# Patient Record
Sex: FEMALE | URBAN - METROPOLITAN AREA
[De-identification: names, ages, dates, MRNs, and addresses within clinical notes are randomized per-mention and may not be internally consistent; named-entity substitution may affect disease eponyms.]

---

## 2017-03-13 ENCOUNTER — APPOINTMENT (RX ONLY)
Dept: URBAN - METROPOLITAN AREA CLINIC 131 | Facility: CLINIC | Age: 37
Setting detail: DERMATOLOGY
End: 2017-03-13

## 2017-03-13 DIAGNOSIS — L40.0 PSORIASIS VULGARIS: ICD-10-CM

## 2017-03-13 PROBLEM — F41.9 ANXIETY DISORDER, UNSPECIFIED: Status: ACTIVE | Noted: 2017-03-13

## 2017-03-13 PROCEDURE — 99213 OFFICE O/P EST LOW 20 MIN: CPT

## 2017-03-13 PROCEDURE — ? TREATMENT REGIMEN

## 2017-03-13 PROCEDURE — ? PRESCRIPTION

## 2017-03-13 PROCEDURE — ? COUNSELING

## 2017-03-13 RX ORDER — SULFACETAMIDE SODIUM 100 MG/ML
SHAMPOO TOPICAL
Qty: 1 | Refills: 2 | Status: ERX

## 2017-03-13 RX ORDER — CLOBETASOL PROPIONATE 0.5 MG/G
AEROSOL, FOAM TOPICAL
Qty: 1 | Refills: 0 | Status: ERX | COMMUNITY
Start: 2017-03-13

## 2017-03-13 RX ADMIN — CLOBETASOL PROPIONATE: 0.5 AEROSOL, FOAM TOPICAL at 13:20

## 2017-03-13 ASSESSMENT — LOCATION DETAILED DESCRIPTION DERM
LOCATION DETAILED: RIGHT OCCIPITAL SCALP
LOCATION DETAILED: MID-OCCIPITAL SCALP
LOCATION DETAILED: LEFT OCCIPITAL SCALP

## 2017-03-13 ASSESSMENT — LOCATION SIMPLE DESCRIPTION DERM: LOCATION SIMPLE: POSTERIOR SCALP

## 2017-03-13 ASSESSMENT — LOCATION ZONE DERM: LOCATION ZONE: SCALP

## 2017-03-13 NOTE — PROCEDURE: TREATMENT REGIMEN
Detail Level: Zone
Action 1: Continue
Otc Regimen: Wash scalp alternating T-Sal shampoo with prescription shampoo

## 2018-02-09 ENCOUNTER — RX ONLY (OUTPATIENT)
Age: 38
Setting detail: RX ONLY
End: 2018-02-09

## 2018-02-09 RX ORDER — CLOBETASOL PROPIONATE 0.5 MG/G
AEROSOL, FOAM TOPICAL
Qty: 1 | Refills: 0 | Status: ERX | COMMUNITY
Start: 2018-02-09

## 2018-02-09 RX ORDER — SULFACETAMIDE SODIUM 100 MG/ML
SHAMPOO TOPICAL
Qty: 1 | Refills: 0 | Status: ERX | COMMUNITY
Start: 2018-02-09

## 2018-02-27 ENCOUNTER — HISTORICAL (OUTPATIENT)
Dept: ADMINISTRATIVE | Facility: HOSPITAL | Age: 38
End: 2018-02-27

## 2018-03-13 ENCOUNTER — APPOINTMENT (RX ONLY)
Dept: URBAN - METROPOLITAN AREA CLINIC 87 | Facility: CLINIC | Age: 38
Setting detail: DERMATOLOGY
End: 2018-03-13

## 2018-03-13 ENCOUNTER — RX ONLY (OUTPATIENT)
Age: 38
Setting detail: RX ONLY
End: 2018-03-13

## 2018-03-13 DIAGNOSIS — L40.0 PSORIASIS VULGARIS: ICD-10-CM

## 2018-03-13 PROBLEM — F41.9 ANXIETY DISORDER, UNSPECIFIED: Status: ACTIVE | Noted: 2018-03-13

## 2018-03-13 PROCEDURE — ? PRESCRIPTION

## 2018-03-13 PROCEDURE — ? TREATMENT REGIMEN

## 2018-03-13 PROCEDURE — 99213 OFFICE O/P EST LOW 20 MIN: CPT

## 2018-03-13 PROCEDURE — ? COUNSELING

## 2018-03-13 RX ORDER — DICAPRYLYL CARBONATE/DIMETH
SPRAY, NON-AEROSOL (ML) TOPICAL
Qty: 1 | Refills: 3 | Status: ERX | COMMUNITY
Start: 2018-03-13

## 2018-03-13 RX ORDER — SULFACETAMIDE SODIUM 100 MG/ML
SHAMPOO TOPICAL
Qty: 1 | Refills: 5 | Status: ERX

## 2018-03-13 RX ORDER — CLOBETASOL PROPIONATE 0.5 MG/ML
SOLUTION TOPICAL
Qty: 1 | Refills: 3 | Status: ERX | COMMUNITY
Start: 2018-03-13

## 2018-03-13 RX ADMIN — Medication: at 19:21

## 2018-03-13 RX ADMIN — CLOBETASOL PROPIONATE: 0.5 SOLUTION TOPICAL at 19:18

## 2018-03-13 ASSESSMENT — LOCATION DETAILED DESCRIPTION DERM: LOCATION DETAILED: MID-OCCIPITAL SCALP

## 2018-03-13 ASSESSMENT — LOCATION SIMPLE DESCRIPTION DERM: LOCATION SIMPLE: POSTERIOR SCALP

## 2018-03-13 ASSESSMENT — LOCATION ZONE DERM: LOCATION ZONE: SCALP

## 2018-03-13 NOTE — PROCEDURE: TREATMENT REGIMEN
Action 4: Continue
Continue Regimen: Ovace Shampoo-wash scalp as directed alternating between T-Sal shampoo
Discontinue Regimen: Olux E Foam
Detail Level: Zone

## 2019-01-29 LAB
HIGH RISK HPV 16 (PRECISION): NEGATIVE
HIGH RISK HPV 18/45 (PRECISION): NEGATIVE
PAP RECOMMENDATION EXT: NORMAL
PAP SMEAR: NORMAL
POC BETA-HCG (QUAL): NEGATIVE

## 2019-02-05 ENCOUNTER — HISTORICAL (OUTPATIENT)
Dept: RADIOLOGY | Facility: HOSPITAL | Age: 39
End: 2019-02-05

## 2019-02-15 ENCOUNTER — HISTORICAL (OUTPATIENT)
Dept: RADIOLOGY | Facility: HOSPITAL | Age: 39
End: 2019-02-15

## 2019-02-15 LAB
ABS NEUT (OLG): 4.24 X10(3)/MCL (ref 2.1–9.2)
ALBUMIN SERPL-MCNC: 3.6 GM/DL (ref 3.4–5)
ALBUMIN/GLOB SERPL: 0.7 RATIO (ref 1.1–2)
ALP SERPL-CCNC: 109 UNIT/L (ref 45–117)
ALT SERPL-CCNC: 17 UNIT/L (ref 12–78)
APPEARANCE, UA: CLEAR
AST SERPL-CCNC: 14 UNIT/L (ref 15–37)
BACTERIA #/AREA URNS AUTO: ABNORMAL /[HPF]
BASOPHILS # BLD AUTO: 0.08 X10(3)/MCL
BASOPHILS NFR BLD AUTO: 1 %
BILIRUB SERPL-MCNC: 0.5 MG/DL (ref 0.2–1)
BILIRUB UR QL STRIP: NEGATIVE
BILIRUBIN DIRECT+TOT PNL SERPL-MCNC: 0.1 MG/DL
BILIRUBIN DIRECT+TOT PNL SERPL-MCNC: 0.4 MG/DL
BUN SERPL-MCNC: 11 MG/DL (ref 7–18)
CALCIUM SERPL-MCNC: 8.6 MG/DL (ref 8.5–10.1)
CHLORIDE SERPL-SCNC: 105 MMOL/L (ref 98–107)
CHOLEST SERPL-MCNC: 169 MG/DL
CHOLEST/HDLC SERPL: 3 {RATIO} (ref 0–4.4)
CO2 SERPL-SCNC: 25 MMOL/L (ref 21–32)
COLOR UR: NORMAL
CREAT SERPL-MCNC: 1 MG/DL (ref 0.6–1.3)
EOSINOPHIL # BLD AUTO: 0.36 10*3/UL
EOSINOPHIL NFR BLD AUTO: 5 %
ERYTHROCYTE [DISTWIDTH] IN BLOOD BY AUTOMATED COUNT: 17 % (ref 11.5–14.5)
EST. AVERAGE GLUCOSE BLD GHB EST-MCNC: 111 MG/DL
GLOBULIN SER-MCNC: 5 GM/ML (ref 2.3–3.5)
GLUCOSE (UA): NORMAL
GLUCOSE SERPL-MCNC: 92 MG/DL (ref 74–106)
HAV IGM SERPL QL IA: NONREACTIVE
HBA1C MFR BLD: 5.5 % (ref 4.2–6.3)
HBV CORE IGM SERPL QL IA: NONREACTIVE
HBV SURFACE AG SERPL QL IA: NEGATIVE
HCT VFR BLD AUTO: 32.9 % (ref 35–46)
HCV AB SERPL QL IA: NONREACTIVE
HDLC SERPL-MCNC: 56 MG/DL
HGB BLD-MCNC: 9.6 GM/DL (ref 12–16)
HGB UR QL STRIP: NEGATIVE
HIV 1+2 AB+HIV1 P24 AG SERPL QL IA: NONREACTIVE
HYALINE CASTS #/AREA URNS LPF: ABNORMAL /[LPF]
IMM GRANULOCYTES # BLD AUTO: 0.04 10*3/UL
IMM GRANULOCYTES NFR BLD AUTO: 1 %
KETONES UR QL STRIP: NEGATIVE
LDLC SERPL CALC-MCNC: 90 MG/DL (ref 0–130)
LEUKOCYTE ESTERASE UR QL STRIP: NEGATIVE
LYMPHOCYTES # BLD AUTO: 1.77 X10(3)/MCL
LYMPHOCYTES NFR BLD AUTO: 24 % (ref 13–40)
MCH RBC QN AUTO: 20.9 PG (ref 26–34)
MCHC RBC AUTO-ENTMCNC: 29.2 GM/DL (ref 31–37)
MCV RBC AUTO: 71.7 FL (ref 80–100)
MONOCYTES # BLD AUTO: 0.72 X10(3)/MCL
MONOCYTES NFR BLD AUTO: 10 % (ref 4–12)
NEUTROPHILS # BLD AUTO: 4.24 X10(3)/MCL
NEUTROPHILS NFR BLD AUTO: 59 X10(3)/MCL
NITRITE UR QL STRIP: NEGATIVE
PH UR STRIP: 5.5 [PH] (ref 4.5–8)
PLATELET # BLD AUTO: 433 X10(3)/MCL (ref 130–400)
PMV BLD AUTO: 9.6 FL (ref 7.4–10.4)
POTASSIUM SERPL-SCNC: 3.9 MMOL/L (ref 3.5–5.1)
PROT SERPL-MCNC: 8.6 GM/DL (ref 6.4–8.2)
PROT UR QL STRIP: NEGATIVE
RBC # BLD AUTO: 4.59 X10(6)/MCL (ref 4–5.2)
RBC #/AREA URNS AUTO: ABNORMAL /[HPF]
SODIUM SERPL-SCNC: 136 MMOL/L (ref 136–145)
SP GR UR STRIP: 1.02 (ref 1–1.03)
SQUAMOUS #/AREA URNS LPF: ABNORMAL /[LPF]
TRIGL SERPL-MCNC: 117 MG/DL
TSH SERPL-ACNC: 1.19 MIU/L (ref 0.36–3.74)
UROBILINOGEN UR STRIP-ACNC: NORMAL
VLDLC SERPL CALC-MCNC: 23 MG/DL
WBC # SPEC AUTO: 7.2 X10(3)/MCL (ref 4.5–11)
WBC #/AREA URNS AUTO: ABNORMAL /HPF

## 2019-09-27 ENCOUNTER — RX ONLY (OUTPATIENT)
Age: 39
Setting detail: RX ONLY
End: 2019-09-27

## 2019-09-27 ENCOUNTER — APPOINTMENT (RX ONLY)
Dept: URBAN - METROPOLITAN AREA CLINIC 87 | Facility: CLINIC | Age: 39
Setting detail: DERMATOLOGY
End: 2019-09-27

## 2019-09-27 DIAGNOSIS — L81.1 CHLOASMA: ICD-10-CM

## 2019-09-27 DIAGNOSIS — L40.0 PSORIASIS VULGARIS: ICD-10-CM | Status: WELL CONTROLLED

## 2019-09-27 PROCEDURE — 99213 OFFICE O/P EST LOW 20 MIN: CPT

## 2019-09-27 PROCEDURE — ? COUNSELING

## 2019-09-27 PROCEDURE — ? TREATMENT REGIMEN

## 2019-09-27 RX ORDER — CLOBETASOL PROPIONATE 0.46 MG/ML
SOLUTION TOPICAL
Qty: 1 | Refills: 3 | Status: ERX

## 2019-09-27 RX ORDER — SULFACETAMIDE SODIUM 100 MG/ML
SHAMPOO TOPICAL
Qty: 1 | Refills: 5 | Status: ERX

## 2019-09-27 RX ORDER — DICAPRYLYL CARBONATE/DIMETH
SPRAY, NON-AEROSOL (ML) TOPICAL
Qty: 1 | Refills: 3 | Status: ERX

## 2019-09-27 ASSESSMENT — LOCATION SIMPLE DESCRIPTION DERM
LOCATION SIMPLE: RIGHT CHEEK
LOCATION SIMPLE: LEFT CHEEK
LOCATION SIMPLE: POSTERIOR SCALP

## 2019-09-27 ASSESSMENT — LOCATION ZONE DERM
LOCATION ZONE: SCALP
LOCATION ZONE: FACE

## 2019-09-27 ASSESSMENT — LOCATION DETAILED DESCRIPTION DERM
LOCATION DETAILED: RIGHT CENTRAL MALAR CHEEK
LOCATION DETAILED: MID-OCCIPITAL SCALP
LOCATION DETAILED: LEFT CENTRAL MALAR CHEEK

## 2019-09-27 NOTE — PROCEDURE: TREATMENT REGIMEN
Action 2: Continue
Detail Level: Zone
Other Instructions: Will send refills since pt is happy with the results
Plan: Suggested hydroquinone 10% and kojic acid 6% compounding cream 2x daily for 8 weeks [facility] and wear sunscreen daily with significant sun protection. Rtc in 8 weeks

## 2021-11-10 ENCOUNTER — HISTORICAL (OUTPATIENT)
Dept: ADMINISTRATIVE | Facility: HOSPITAL | Age: 41
End: 2021-11-10

## 2021-11-10 LAB
ABS NEUT (OLG): 4.37 X10(3)/MCL (ref 2.1–9.2)
ALBUMIN SERPL-MCNC: 3.4 GM/DL (ref 3.5–5)
ALBUMIN/GLOB SERPL: 0.8 RATIO (ref 1.1–2)
ALP SERPL-CCNC: 102 UNIT/L (ref 40–150)
ALT SERPL-CCNC: 13 UNIT/L (ref 0–55)
APPEARANCE, UA: CLEAR
AST SERPL-CCNC: 17 UNIT/L (ref 5–34)
BACTERIA #/AREA URNS AUTO: ABNORMAL /HPF
BASOPHILS # BLD AUTO: 0.1 X10(3)/MCL (ref 0–0.2)
BASOPHILS NFR BLD AUTO: 1 %
BILIRUB SERPL-MCNC: 0.4 MG/DL
BILIRUB UR QL STRIP: NEGATIVE
BILIRUBIN DIRECT+TOT PNL SERPL-MCNC: 0.2 MG/DL (ref 0–0.5)
BILIRUBIN DIRECT+TOT PNL SERPL-MCNC: 0.2 MG/DL (ref 0–0.8)
BUN SERPL-MCNC: 12 MG/DL (ref 7–18.7)
CALCIUM SERPL-MCNC: 9.3 MG/DL (ref 8.7–10.5)
CHLORIDE SERPL-SCNC: 107 MMOL/L (ref 98–107)
CHOLEST SERPL-MCNC: 156 MG/DL
CHOLEST/HDLC SERPL: 3 {RATIO} (ref 0–5)
CO2 SERPL-SCNC: 24 MMOL/L (ref 22–29)
COLOR UR: ABNORMAL
CREAT SERPL-MCNC: 0.79 MG/DL (ref 0.55–1.02)
DEPRECATED CALCIDIOL+CALCIFEROL SERPL-MC: 9.3 NG/ML (ref 30–80)
EOSINOPHIL # BLD AUTO: 0.4 X10(3)/MCL (ref 0–0.9)
EOSINOPHIL NFR BLD AUTO: 5 %
ERYTHROCYTE [DISTWIDTH] IN BLOOD BY AUTOMATED COUNT: 17.8 % (ref 11.5–14.5)
EST CREAT CLEARANCE SER (OHS): 58.87 ML/MIN
EST. AVERAGE GLUCOSE BLD GHB EST-MCNC: 99.7 MG/DL
FERRITIN SERPL-MCNC: 5.47 NG/ML (ref 4.63–204)
GLOBULIN SER-MCNC: 4.3 GM/DL (ref 2.4–3.5)
GLUCOSE (UA): NEGATIVE
GLUCOSE SERPL-MCNC: 105 MG/DL (ref 74–100)
HBA1C MFR BLD: 5.1 %
HCT VFR BLD AUTO: 29.2 % (ref 35–46)
HDLC SERPL-MCNC: 50 MG/DL (ref 35–60)
HGB BLD-MCNC: 8.7 GM/DL (ref 12–16)
HGB UR QL STRIP: NEGATIVE
HIV 1+2 AB+HIV1 P24 AG SERPL QL IA: NONREACTIVE
HYALINE CASTS #/AREA URNS LPF: ABNORMAL /LPF
HYPOCHROMIA BLD QL SMEAR: NORMAL
IMM GRANULOCYTES # BLD AUTO: 0.02 10*3/UL
IMM GRANULOCYTES NFR BLD AUTO: 0 %
IRON SATN MFR SERPL: 5 % (ref 20–50)
IRON SERPL-MCNC: 19 UG/DL (ref 50–170)
KETONES UR QL STRIP: NEGATIVE
LDLC SERPL CALC-MCNC: 90 MG/DL (ref 50–140)
LEUKOCYTE ESTERASE UR QL STRIP: 25 LEU/UL
LYMPHOCYTES # BLD AUTO: 1.8 X10(3)/MCL (ref 0.6–4.6)
LYMPHOCYTES NFR BLD AUTO: 25 %
MCH RBC QN AUTO: 19.9 PG (ref 26–34)
MCHC RBC AUTO-ENTMCNC: 29.8 GM/DL (ref 31–37)
MCV RBC AUTO: 66.7 FL (ref 80–100)
MONOCYTES # BLD AUTO: 0.7 X10(3)/MCL (ref 0.1–1.3)
MONOCYTES NFR BLD AUTO: 9 %
NEUTROPHILS # BLD AUTO: 4.37 X10(3)/MCL (ref 2.1–9.2)
NEUTROPHILS NFR BLD AUTO: 59 %
NITRITE UR QL STRIP: NEGATIVE
NRBC BLD AUTO-RTO: 0 % (ref 0–0.2)
PH UR STRIP: 5.5 [PH] (ref 4.5–8)
PLATELET # BLD AUTO: 425 X10(3)/MCL (ref 130–400)
PLATELET # BLD EST: NORMAL 10*3/UL
PMV BLD AUTO: 9.6 FL (ref 7.4–10.4)
POTASSIUM SERPL-SCNC: 3.6 MMOL/L (ref 3.5–5.1)
PROT SERPL-MCNC: 7.7 GM/DL (ref 6.4–8.3)
PROT UR QL STRIP: NEGATIVE
RBC # BLD AUTO: 4.38 X10(6)/MCL (ref 4–5.2)
RBC #/AREA URNS AUTO: ABNORMAL /HPF
RBC MORPH BLD: NORMAL
SODIUM SERPL-SCNC: 137 MMOL/L (ref 136–145)
SP GR UR STRIP: 1.02 (ref 1–1.03)
SQUAMOUS #/AREA URNS LPF: ABNORMAL /LPF
T PALLIDUM AB SER QL: NONREACTIVE
T4 FREE SERPL-MCNC: 0.83 NG/DL (ref 0.7–1.48)
TARGETS BLD QL SMEAR: NORMAL
TIBC SERPL-MCNC: 384 UG/DL (ref 70–310)
TIBC SERPL-MCNC: 403 UG/DL (ref 250–450)
TRANSFERRIN SERPL-MCNC: 375 MG/DL (ref 180–382)
TRIGL SERPL-MCNC: 80 MG/DL (ref 37–140)
TSH SERPL-ACNC: 1.58 UIU/ML (ref 0.35–4.94)
UROBILINOGEN UR STRIP-ACNC: 2 MG/DL
VLDLC SERPL CALC-MCNC: 16 MG/DL
WBC # SPEC AUTO: 7.4 X10(3)/MCL (ref 4.5–11)
WBC #/AREA URNS AUTO: ABNORMAL /HPF

## 2021-11-12 LAB — FINAL CULTURE: NORMAL

## 2022-04-10 ENCOUNTER — HISTORICAL (OUTPATIENT)
Dept: ADMINISTRATIVE | Facility: HOSPITAL | Age: 42
End: 2022-04-10
Payer: MEDICAID

## 2022-04-29 VITALS
SYSTOLIC BLOOD PRESSURE: 130 MMHG | WEIGHT: 216.06 LBS | WEIGHT: 218.5 LBS | DIASTOLIC BLOOD PRESSURE: 87 MMHG | SYSTOLIC BLOOD PRESSURE: 137 MMHG | BODY MASS INDEX: 45.35 KG/M2 | DIASTOLIC BLOOD PRESSURE: 86 MMHG | HEIGHT: 58 IN | BODY MASS INDEX: 45.87 KG/M2 | HEIGHT: 58 IN

## 2022-05-02 NOTE — HISTORICAL [facility] CERNER
This is a historical note converted from Darlene. Formatting and pictures may have been removed.  Please reference Darlene for original formatting and attached multimedia. Chief Complaint  establish care  History of Present Illness  41?yo?female here today?to establish care. PMH HTN, anemia,?& migraines.? Negative FH Cancers.?Nonsmoker. ?  ?   Cervical Cancer Screening?-?[facility] GYN Referral  Breast Cancer?Screening -?11/10/21 Ordered  Osteoporosis Screening?-? 11/10/21 Vitamin D Level  STI Screening -?11/10/21 Ordered  ?   Todays Visit:  Pt reports a hx of HTN - not currently taking any medications, BP controlled in clinic today; hx of anemia, will order labs today for eval. Denies any acute issues today. Agreeable to labs, MMG, GYN, & Flu shot.  Denies chest pain, shortness of breath,?cough, fever, headache,?dizziness, weakness, abdominal pain, nausea,?vomiting, diarrhea, constipation, black/bloody stools, unplanned weight loss, night sweats, changes in urinary patterns, burning/odor with urination, depression, anxiety, and SI/HI.??  Review of Systems  Constitutional:?no fever, fatigue, weakness  Eye:?no vision loss, eye redness, drainage, or pain  ENMT:?no sore throat, ear pain, sinus pain/congestion, nasal congestion/drainage  Respiratory:?no cough, no wheezing, no shortness of breath  Cardiovascular:?no chest pain, no palpitations, no edema  Gastrointestinal:?no nausea, vomiting, or diarrhea. No abdominal pain  Genitourinary:?no dysuria, no urinary frequency or urgency, no hematuria  Hema/Lymph:?no abnormal bruising or bleeding  Endocrine:?no heat or cold intolerance, no excessive thirst or excessive urination  Musculoskeletal:?no muscle or joint pain, no joint swelling  Integumentary:?no skin rash or abnormal lesion  Neurologic: no headache, no dizziness, no weakness or numbness  Physical Exam  Vitals & Measurements  T:?36.8? ?C (Oral)? RR:?16? BP:?130/86?  HT:?147.00?cm? WT:?98.000?kg? BMI:?45.35? LMP:?11/01/2021  00:00 CDT?  General:?well-developed well-nourished in no acute distress  Eye: PERRLA, EOMI, clear conjunctiva, eyelids normal  HENT:?TMs/ear canals clear, oropharynx without erythema/exudate, oropharynx and nasal mucosal surfaces moist, no maxillary/frontal sinus tenderness to palpation  Neck: full range of motion, no thyromegaly or lymphadenopathy  Respiratory:?clear to auscultation bilaterally  Cardiovascular:?regular rate and rhythm without murmurs, gallops or rubs  Gastrointestinal:?soft, non-tender, non-distended with normal bowel sounds, without masses to palpation  Genitourinary: no CVA tenderness to palpation  Musculoskeletal:?full range of motion of all extremities/spine without limitation or discomfort  Integumentary: no rashes or skin lesions present  Neurologic: cranial nerves intact, no signs of peripheral neurological deficit, motor/sensory function intact  Assessment/Plan  1.?Wellness examination?Z00.00  Wellness Exam + Labs  Ordered:  1160F- Medication reconciliation completed during visit, Wellness examination  Anemia, unspecified  BMI 45.0-49.9, adult, [facility] Med Service, 11/10/21 10:10:00 CST  CBC w/ Auto Diff, Routine collect, *Est. 11/10/22 3:00:00 CST, Blood, Order for future visit, *Est. Stop date 11/10/22 3:00:00 CST, Lab Collect, Wellness examination  Anemia, unspecified  BMI 45.0-49.9, adult, 11/10/21 10:09:00 CST  CBC w/ Auto Diff, Routine collect, *Est. 11/10/21 3:00:00 CST, Blood, Order for future visit, *Est. Stop date 11/10/21 3:00:00 CST, Lab Collect, Wellness examination, 11/10/21 10:03:00 CST  Chlam trachom & N gonorrhoeae by MAY-[facility] [id number], Routine collect, Urine, Order for future visit, *Est. 11/10/21 3:00:00 CST, *Est. Stop date 11/10/21 3:00:00 CST, Nurse collect, Wellness examination, 11/10/21 10:03:00 CST  Clinic Follow up, *Est. 11/10/22 3:00:00 CST, Order for future visit, Wellness examination  Anemia, unspecified  BMI 45.0-49.9, adult, [facility] Med  [facility]  Comprehensive Metabolic Panel, Routine collect, *Est. 11/10/22 3:00:00 CST, Blood, Order for future visit, *Est. Stop date 11/10/22 3:00:00 CST, Lab Collect, Wellness examination  Anemia, unspecified  BMI 45.0-49.9, adult, 11/10/21 10:09:00 CST  Comprehensive Metabolic Panel, Routine collect, *Est. 11/10/21 3:00:00 CST, Blood, Order for future visit, *Est. Stop date 11/10/21 3:00:00 CST, Lab Collect, Wellness examination, 11/10/21 10:03:00 CST  Free T4, Routine collect, *Est. 11/10/21 3:00:00 CST, Blood, Order for future visit, *Est. Stop date 11/10/21 3:00:00 CST, Lab Collect, Wellness examination, 11/10/21 10:03:00 CST  Free T4, Routine collect, *Est. 11/10/22 3:00:00 CST, Blood, Order for future visit, *Est. Stop date 11/10/22 3:00:00 CST, Lab Collect, Wellness examination  Anemia, unspecified  BMI 45.0-49.9, adult, 11/10/21 10:09:00 CST  Hemoglobin A1C UHC, Routine collect, *Est. 11/10/22 3:00:00 CST, Blood, Order for future visit, *Est. Stop date 11/10/22 3:00:00 CST, Lab Collect, Wellness examination  Anemia, unspecified  BMI 45.0-49.9, adult, 11/10/21 10:09:00 CST  Hemoglobin A1C UHC, Routine collect, *Est. 11/10/21 3:00:00 CST, Blood, Order for future visit, *Est. Stop date 11/10/21 3:00:00 CST, Lab Collect, BMI 45.0-49.9, adult, 11/10/21 10:02:00 CST  HIV 1 and 2, Routine collect, *Est. 11/10/21 3:00:00 CST, Blood, Order for future visit, *Est. Stop date 11/10/21 3:00:00 CST, Lab Collect, Wellness examination, 11/10/21 10:03:00 CST  Lipid Panel, Routine collect, *Est. 11/10/21 3:00:00 CST, Blood, Order for future visit, *Est. Stop date 11/10/21 3:00:00 CST, Lab Collect, Wellness examination, 11/10/21 10:03:00 CST  Lipid Panel, Routine collect, *Est. 11/10/22 3:00:00 CST, Blood, Order for future visit, *Est. Stop date 11/10/22 3:00:00 CST, Lab Collect, Wellness examination  Anemia, unspecified  BMI 45.0-49.9, adult, 11/10/21 10:09:00 CST  Office/Outpatient Visit Level 4 Established [id number] PC,  Wellness examination  Anemia, unspecified  BMI 45.0-49.9, adult, [facility] Internal Med Service, 11/10/21 10:09:00 CST  Syphilis Antibody (with Reflex RPR), Routine collect, *Est. 11/10/21 3:00:00 CST, Blood, Order for future visit, *Est. Stop date 11/10/21 3:00:00 CST, Lab Collect, Wellness examination, 11/10/21 10:03:00 CST  Thyroid Stimulating Hormone, Routine collect, *Est. 11/10/21 3:00:00 CST, Blood, Order for future visit, *Est. Stop date 11/10/21 3:00:00 CST, Lab Collect, Wellness examination, 11/10/21 10:03:00 CST  Thyroid Stimulating Hormone, Routine collect, *Est. 11/10/22 3:00:00 CST, Blood, Order for future visit, *Est. Stop date 11/10/22 3:00:00 CST, Lab Collect, Wellness examination  Anemia, unspecified  BMI 45.0-49.9, adult, 11/10/21 10:09:00 CST  Urinalysis with Microscopic if Indicated, Routine collect, Urine, Order for future visit, *Est. 11/10/22 3:00:00 CST, *Est. Stop date 11/10/22 3:00:00 CST, Nurse collect, Wellness examination  Anemia, unspecified  BMI 45.0-49.9, adult  Urinalysis with Microscopic if Indicated, Routine collect, Urine, Order for future visit, *Est. 11/10/21 3:00:00 CST, *Est. Stop date 11/10/21 3:00:00 CST, Nurse collect, Wellness examination  Vitamin D, 25-Hydroxy Level, Routine collect, *Est. 11/10/21 3:00:00 CST, Blood, Order for future visit, *Est. Stop date 11/10/21 3:00:00 CST, Lab Collect, Wellness examination, 11/10/21 10:03:00 CST  Vitamin D, 25-Hydroxy Level, Routine collect, *Est. 11/10/22 3:00:00 CST, Blood, Order for future visit, *Est. Stop date 11/10/22 3:00:00 CST, Lab Collect, Wellness examination  Anemia, unspecified  BMI 45.0-49.9, adult, 11/10/21 10:09:00 CST  ?  2.?Anemia, unspecified?D64.9  Labs ordered  Ordered:  1160F- Medication reconciliation completed during visit, Wellness examination  Anemia, unspecified  BMI 45.0-49.9, adult, [facility] Med Service, 11/10/21 10:10:00 CST  CBC w/ Auto Diff, Routine collect, *Est. 11/10/22 3:00:00 CST,  Blood, Order for future visit, *Est. Stop date 11/10/22 3:00:00 CST, Lab Collect, Wellness examination  Anemia, unspecified  BMI 45.0-49.9, adult, 11/10/21 10:09:00 CST  Clinic Follow up, *Est. 11/10/22 3:00:00 CST, Order for future visit, Wellness examination  Anemia, unspecified  BMI 45.0-49.9, adult, [facility] Internal Med Service  Comprehensive Metabolic Panel, Routine collect, *Est. 11/10/22 3:00:00 CST, Blood, Order for future visit, *Est. Stop date 11/10/22 3:00:00 CST, Lab Collect, Wellness examination  Anemia, unspecified  BMI 45.0-49.9, adult, 11/10/21 10:09:00 CST  Ferritin, Routine collect, *Est. 11/10/21 3:00:00 CST, Blood, Order for future visit, *Est. Stop date 11/10/21 3:00:00 CST, Lab Collect, Anemia, unspecified, 11/10/21 10:04:00 CST  Free T4, Routine collect, *Est. 11/10/22 3:00:00 CST, Blood, Order for future visit, *Est. Stop date 11/10/22 3:00:00 CST, Lab Collect, Wellness examination  Anemia, unspecified  BMI 45.0-49.9, adult, 11/10/21 10:09:00 CST  Hemoglobin A1C UHC, Routine collect, *Est. 11/10/22 3:00:00 CST, Blood, Order for future visit, *Est. Stop date 11/10/22 3:00:00 CST, Lab Collect, Wellness examination  Anemia, unspecified  BMI 45.0-49.9, adult, 11/10/21 10:09:00 CST  Iron Binding Capacity Total - Iron Profile, Routine collect, *Est. 11/10/21 3:00:00 CST, Blood, Order for future visit, *Est. Stop date 11/10/21 3:00:00 CST, Lab Collect, Anemia, unspecified, 11/10/21 10:04:00 CST  Iron Level, Routine collect, *Est. 11/10/21 3:00:00 CST, Blood, Order for future visit, *Est. Stop date 11/10/21 3:00:00 CST, Lab Collect, Anemia, unspecified, 11/11/21 5:00:00 CST  Lipid Panel, Routine collect, *Est. 11/10/22 3:00:00 CST, Blood, Order for future visit, *Est. Stop date 11/10/22 3:00:00 CST, Lab Collect, Wellness examination  Anemia, unspecified  BMI 45.0-49.9, adult, 11/10/21 10:09:00 CST  Office/Outpatient Visit Level 4 Established [id number] PC, Wellness examination  Anemia,  unspecified  BMI 45.0-49.9, adult, [facility] Internal Med Service, 11/10/21 10:09:00 CST  Thyroid Stimulating Hormone, Routine collect, *Est. 11/10/22 3:00:00 CST, Blood, Order for future visit, *Est. Stop date 11/10/22 3:00:00 CST, Lab Collect, Wellness examination  Anemia, unspecified  BMI 45.0-49.9, adult, 11/10/21 10:09:00 CST  Urinalysis with Microscopic if Indicated, Routine collect, Urine, Order for future visit, *Est. 11/10/22 3:00:00 CST, *Est. Stop date 11/10/22 3:00:00 CST, Nurse collect, Wellness examination  Anemia, unspecified  BMI 45.0-49.9, adult  Vitamin D, 25-Hydroxy Level, Routine collect, *Est. 11/10/22 3:00:00 CST, Blood, Order for future visit, *Est. Stop date 11/10/22 3:00:00 CST, Lab Collect, Wellness examination  Anemia, unspecified  BMI 45.0-49.9, adult, 11/10/21 10:09:00 CST  ?  3.?BMI 45.0-49.9, adult?Z68.42  goal BMI <30.  Exercise 5 times a week for 30 minutes per day.  Avoid soda, simple sugars, excessive rice, potatoes or bread. Limit fast foods and fried foods.  Choose complex carbs in moderation (example: green vegetables, beans, oatmeal). Eat plenty of fresh fruits and vegetables with lean meats daily.  Do not skip meals. Eat a balanced portion size.  Avoid fad diets. Consider permanent healthy life style changes.  Ordered:  1160F- Medication reconciliation completed during visit, Wellness examination  Anemia, unspecified  BMI 45.0-49.9, adult, [facility] Internal Med Service, 11/10/21 10:10:00 CST  CBC w/ Auto Diff, Routine collect, *Est. 11/10/22 3:00:00 CST, Blood, Order for future visit, *Est. Stop date 11/10/22 3:00:00 CST, Lab Collect, Wellness examination  Anemia, unspecified  BMI 45.0-49.9, adult, 11/10/21 10:09:00 CST  Clinic Follow up, *Est. 11/10/22 3:00:00 CST, Order for future visit, Wellness examination  Anemia, unspecified  BMI 45.0-49.9, adult, [facility] Med Service  Comprehensive Metabolic Panel, Routine collect, *Est. 11/10/22 3:00:00 CST, Blood, Order for  future visit, *Est. Stop date 11/10/22 3:00:00 CST, Lab Collect, Wellness examination  Anemia, unspecified  BMI 45.0-49.9, adult, 11/10/21 10:09:00 CST  Free T4, Routine collect, *Est. 11/10/22 3:00:00 CST, Blood, Order for future visit, *Est. Stop date 11/10/22 3:00:00 CST, Lab Collect, Wellness examination  Anemia, unspecified  BMI 45.0-49.9, adult, 11/10/21 10:09:00 CST  Hemoglobin A1C UHC, Routine collect, *Est. 11/10/22 3:00:00 CST, Blood, Order for future visit, *Est. Stop date 11/10/22 3:00:00 CST, Lab Collect, Wellness examination  Anemia, unspecified  BMI 45.0-49.9, adult, 11/10/21 10:09:00 CST  Hemoglobin A1C UHC, Routine collect, *Est. 11/10/21 3:00:00 CST, Blood, Order for future visit, *Est. Stop date 11/10/21 3:00:00 CST, Lab Collect, BMI 45.0-49.9, adult, 11/10/21 10:02:00 CST  Lipid Panel, Routine collect, *Est. 11/10/22 3:00:00 CST, Blood, Order for future visit, *Est. Stop date 11/10/22 3:00:00 CST, Lab Collect, Wellness examination  Anemia, unspecified  BMI 45.0-49.9, adult, 11/10/21 10:09:00 CST  Office/Outpatient Visit Level 4 Established [id number] PC, Wellness examination  Anemia, unspecified  BMI 45.0-49.9, adult, [facility] Internal Med Service, 11/10/21 10:09:00 CST  Thyroid Stimulating Hormone, Routine collect, *Est. 11/10/22 3:00:00 CST, Blood, Order for future visit, *Est. Stop date 11/10/22 3:00:00 CST, Lab Collect, Wellness examination  Anemia, unspecified  BMI 45.0-49.9, adult, 11/10/21 10:09:00 CST  Urinalysis with Microscopic if Indicated, Routine collect, Urine, Order for future visit, *Est. 11/10/22 3:00:00 CST, *Est. Stop date 11/10/22 3:00:00 CST, Nurse collect, Wellness examination  Anemia, unspecified  BMI 45.0-49.9, adult  Vitamin D, 25-Hydroxy Level, Routine collect, *Est. 11/10/22 3:00:00 CST, Blood, Order for future visit, *Est. Stop date 11/10/22 3:00:00 CST, Lab Collect, Wellness examination  Anemia, unspecified  BMI 45.0-49.9, adult, 11/10/21 10:09:00  CST  ?  4.?Immunization due?Z23,?Immunization due?Z23  Flu shot today  Ordered:  Influenza Virus Vaccine, Inactivated, 0.5 mL, form: Susp, IM, Once-Unscheduled, first dose 11/10/21 10:09:00 CST  ?  5.?Breast cancer screening?Z12.39  MMG Ordered  Ordered:  MG Screening Bilateral, Routine, *Est. 21 3:00:00 CST, Screening, None, Ambulatory, Rad Type, Order for future visit, Breast cancer screening, Schedule this test, [facility], Follow Breast Imaging Order Set Protocol, *Est. 21 3:00:00 CST  ?  6.?Cervical cancer screening?Z12.4,?Cervical cancer screening?Z12.4  GYN Referral  ?  Referrals  [facility] Internal Referral to Gynecology Clinic, Specialty: Gynecology, Reason: Wellness / pap, Start: 11/10/21 10:07:00 CST  Clinic Follow up, *Est. 11/10/22 3:00:00 CST, Order for future visit, Wellness examination  Anemia, unspecified  BMI 45.0-49.9, adult, [facility] Internal Med Service  RTC?prn and 12 months  Medications reviewed & reconciled during visit  Labs reviewed, verbalized understanding  Labs 1 week prior to next appointment  COVID 19 Precautions discussed  ?   Discussed with patient health goals related to [facility]?- Restore, Maintain,?&?Improve Health  Patient Goal this visit:?Maintain   Problem List/Past Medical History  Ongoing  Dysmenorrhea(  Confirmed  )  Edema(  Confirmed  )  Migraines(  Confirmed  )  Obesity(  Confirmed  )  Sacroiliitis(  Confirmed  )  Historical  HTN (hypertension)(  Confirmed  )  Procedure/Surgical History  Bilateral tubal ligation   section x2  Cholecystectomy   Medications  influenza virus vaccine, inactivated quadrivalent intramuscular suspension, 0.5 mL, IM, Once-Unscheduled  Allergies  No Known Allergies  Social History  Abuse/Neglect  No, 2020  Alcohol  Never, 2018  Employment/School  Unemployed, 2019  Exercise  Exercise frequency: 1-2 times/week. Exercise type: Walking., 2018  Home/Environment  Lives with  Children, Spouse. Alcohol abuse in household: No. Substance abuse in household: No. Smoker in household: No. Injuries/Abuse/Neglect in household: No. Feels unsafe at home: No. Safe place to go: Yes. Family/Friends available for support: Yes., 02/27/2018  Nutrition/Health  Regular, 02/27/2018  Sexual  Sexually active: Yes. Number of current partners 1. Gender Identity Identifies as female., 01/29/2019  Substance Use  Never, 02/27/2018  Tobacco  Never (less than 100 in lifetime), N/A, 01/21/2020  Never (less than 100 in lifetime), N/A, 02/18/2019  Family History  Family history is negative  Immunizations  Vaccine Date Status   COVID-19 mRNA, LNP-S, PF - Moderna 04/17/2021 Recorded   COVID-19 mRNA, LNP-S, PF - Moderna 03/17/2021 Recorded   influenza virus vaccine, inactivated 02/27/2018 Given   tetanus/diphtheria/pertussis, acel(Tdap) 02/27/2018 Given   influenza virus vaccine, inactivated 11/01/2012 Recorded   Health Maintenance  Health Maintenance  ???Pending?(in the next year)  ??? ??OverDue  ??? ? ? ?Alcohol Misuse Screening due??01/02/21??and every 1??year(s)  ??? ? ? ?Hypertension Management-BMP due??01/20/21??and every 1??year(s)  ??? ??Due In Future?  ??? ? ? ?Obesity Screening not due until??01/01/22??and every 1??year(s)  ??? ? ? ?Cervical Cancer Screening not due until??01/28/22??and every 3??year(s)  ???Satisfied?(in the past 1 year)  ??? ??Satisfied?  ??? ? ? ?ADL Screening on??11/10/21.??Satisfied by Laisha Bhat LPN  ??? ? ? ?Blood Pressure Screening on??11/10/21.??Satisfied by Laisha Bhat LPN  ??? ? ? ?Body Mass Index Check on??11/10/21.??Satisfied by Laisha Bhat LPN  ??? ? ? ?Depression Screening on??11/10/21.??Satisfied by Laisha Bhat LPN  ??? ? ? ?Hypertension Management-Blood Pressure on??11/10/21.??Satisfied by Laisha Bhat LPN  ??? ? ? ?Influenza Vaccine on??11/10/21.??Satisfied by Kassy Silveira  ??? ? ? ?Obesity Screening on??11/10/21.??Satisfied by Laisha Bhat LPN  ?       1304 Called and discussed lab results with patient; verbalized understanding of RX Vitamin D and starting OTC Vitamin D + Calcium once completed, also understands daily iron supplement with Vitamin C tab or OJ.

## 2022-09-15 ENCOUNTER — HISTORICAL (OUTPATIENT)
Dept: ADMINISTRATIVE | Facility: HOSPITAL | Age: 42
End: 2022-09-15
Payer: MEDICAID

## 2023-05-17 ENCOUNTER — HOSPITAL ENCOUNTER (EMERGENCY)
Facility: HOSPITAL | Age: 43
Discharge: HOME | End: 2023-05-17
Attending: EMERGENCY MEDICINE
Payer: MEDICAID

## 2023-05-17 VITALS
DIASTOLIC BLOOD PRESSURE: 91 MMHG | TEMPERATURE: 98 F | HEART RATE: 72 BPM | HEIGHT: 57 IN | WEIGHT: 199.38 LBS | OXYGEN SATURATION: 100 % | RESPIRATION RATE: 12 BRPM | BODY MASS INDEX: 43.02 KG/M2 | SYSTOLIC BLOOD PRESSURE: 114 MMHG

## 2023-05-17 DIAGNOSIS — R11.2 NAUSEA VOMITING AND DIARRHEA: ICD-10-CM

## 2023-05-17 DIAGNOSIS — I10 HYPERTENSION, UNSPECIFIED TYPE: Primary | ICD-10-CM

## 2023-05-17 DIAGNOSIS — R19.7 NAUSEA VOMITING AND DIARRHEA: ICD-10-CM

## 2023-05-17 DIAGNOSIS — I10 HYPERTENSION: ICD-10-CM

## 2023-05-17 LAB
ALBUMIN SERPL-MCNC: 3.1 G/DL (ref 3.5–5)
ALBUMIN/GLOB SERPL: 0.7 RATIO (ref 1.1–2)
ALP SERPL-CCNC: 106 UNIT/L (ref 40–150)
ALT SERPL-CCNC: 11 UNIT/L (ref 0–55)
ANISOCYTOSIS BLD QL SMEAR: SLIGHT
APPEARANCE UR: CLEAR
AST SERPL-CCNC: 13 UNIT/L (ref 5–34)
BACTERIA #/AREA URNS AUTO: ABNORMAL /HPF
BASOPHILS # BLD AUTO: 0.01 X10(3)/MCL
BASOPHILS NFR BLD AUTO: 0.1 %
BILIRUB UR QL STRIP.AUTO: NEGATIVE MG/DL
BILIRUBIN DIRECT+TOT PNL SERPL-MCNC: 0.8 MG/DL
BUN SERPL-MCNC: 9 MG/DL (ref 7–18.7)
CALCIUM SERPL-MCNC: 9 MG/DL (ref 8.4–10.2)
CHLORIDE SERPL-SCNC: 105 MMOL/L (ref 98–107)
CO2 SERPL-SCNC: 21 MMOL/L (ref 22–29)
COLOR UR: YELLOW
CREAT SERPL-MCNC: 0.85 MG/DL (ref 0.55–1.02)
EOSINOPHIL # BLD AUTO: 0.5 X10(3)/MCL (ref 0–0.9)
EOSINOPHIL NFR BLD AUTO: 6.2 %
ERYTHROCYTE [DISTWIDTH] IN BLOOD BY AUTOMATED COUNT: 17.4 % (ref 11.5–17)
GFR SERPLBLD CREATININE-BSD FMLA CKD-EPI: >60 MLS/MIN/1.73/M2
GLOBULIN SER-MCNC: 4.4 GM/DL (ref 2.4–3.5)
GLUCOSE SERPL-MCNC: 111 MG/DL (ref 74–100)
GLUCOSE UR QL STRIP.AUTO: ABNORMAL MG/DL
HCT VFR BLD AUTO: 34.1 % (ref 37–47)
HGB BLD-MCNC: 10.2 G/DL (ref 12–16)
IMM GRANULOCYTES # BLD AUTO: 0.01 X10(3)/MCL (ref 0–0.04)
IMM GRANULOCYTES NFR BLD AUTO: 0.1 %
KETONES UR QL STRIP.AUTO: NEGATIVE MG/DL
LEUKOCYTE ESTERASE UR QL STRIP.AUTO: NEGATIVE UNIT/L
LYMPHOCYTES # BLD AUTO: 1.29 X10(3)/MCL (ref 0.6–4.6)
LYMPHOCYTES NFR BLD AUTO: 15.9 %
MCH RBC QN AUTO: 21.6 PG (ref 27–31)
MCHC RBC AUTO-ENTMCNC: 29.9 G/DL (ref 33–36)
MCV RBC AUTO: 72.1 FL (ref 80–94)
MICROCYTES BLD QL SMEAR: SLIGHT
MONOCYTES # BLD AUTO: 0.77 X10(3)/MCL (ref 0.1–1.3)
MONOCYTES NFR BLD AUTO: 9.5 %
NEUTROPHILS # BLD AUTO: 5.52 X10(3)/MCL (ref 2.1–9.2)
NEUTROPHILS NFR BLD AUTO: 68.2 %
NITRITE UR QL STRIP.AUTO: NEGATIVE
PH UR STRIP.AUTO: 6 [PH]
PLATELET # BLD AUTO: 424 X10(3)/MCL (ref 130–400)
PLATELET # BLD EST: ADEQUATE 10*3/UL
PMV BLD AUTO: 9.6 FL (ref 7.4–10.4)
POIKILOCYTOSIS BLD QL SMEAR: SLIGHT
POTASSIUM SERPL-SCNC: 3.6 MMOL/L (ref 3.5–5.1)
PROT SERPL-MCNC: 7.5 GM/DL (ref 6.4–8.3)
PROT UR QL STRIP.AUTO: NEGATIVE MG/DL
RBC # BLD AUTO: 4.73 X10(6)/MCL (ref 4.2–5.4)
RBC #/AREA URNS AUTO: ABNORMAL /HPF
RBC MORPH BLD: ABNORMAL
RBC UR QL AUTO: ABNORMAL UNIT/L
SODIUM SERPL-SCNC: 135 MMOL/L (ref 136–145)
SP GR UR STRIP.AUTO: 1.01
SQUAMOUS #/AREA URNS AUTO: ABNORMAL /HPF
TROPONIN I SERPL-MCNC: <0.01 NG/ML (ref 0–0.04)
UROBILINOGEN UR STRIP-ACNC: 0.2 MG/DL
WBC # SPEC AUTO: 8.1 X10(3)/MCL (ref 4.5–11.5)
WBC #/AREA URNS AUTO: ABNORMAL /HPF

## 2023-05-17 PROCEDURE — 99284 EMERGENCY DEPT VISIT MOD MDM: CPT

## 2023-05-17 PROCEDURE — 84484 ASSAY OF TROPONIN QUANT: CPT | Performed by: EMERGENCY MEDICINE

## 2023-05-17 PROCEDURE — 80053 COMPREHEN METABOLIC PANEL: CPT | Performed by: EMERGENCY MEDICINE

## 2023-05-17 PROCEDURE — 85025 COMPLETE CBC W/AUTO DIFF WBC: CPT | Performed by: EMERGENCY MEDICINE

## 2023-05-17 PROCEDURE — 63600175 PHARM REV CODE 636 W HCPCS: Performed by: EMERGENCY MEDICINE

## 2023-05-17 PROCEDURE — 93005 ELECTROCARDIOGRAM TRACING: CPT

## 2023-05-17 PROCEDURE — 96374 THER/PROPH/DIAG INJ IV PUSH: CPT

## 2023-05-17 PROCEDURE — 25000003 PHARM REV CODE 250: Performed by: EMERGENCY MEDICINE

## 2023-05-17 PROCEDURE — 81001 URINALYSIS AUTO W/SCOPE: CPT | Performed by: EMERGENCY MEDICINE

## 2023-05-17 PROCEDURE — 96375 TX/PRO/DX INJ NEW DRUG ADDON: CPT

## 2023-05-17 PROCEDURE — 96361 HYDRATE IV INFUSION ADD-ON: CPT

## 2023-05-17 RX ORDER — LOSARTAN POTASSIUM 25 MG/1
12.5 TABLET ORAL DAILY
Qty: 15 TABLET | Refills: 0 | Status: SHIPPED | OUTPATIENT
Start: 2023-05-17

## 2023-05-17 RX ORDER — ONDANSETRON 4 MG/1
4 TABLET, ORALLY DISINTEGRATING ORAL EVERY 6 HOURS PRN
Qty: 12 TABLET | Refills: 0 | Status: SHIPPED | OUTPATIENT
Start: 2023-05-17

## 2023-05-17 RX ORDER — ONDANSETRON 2 MG/ML
4 INJECTION INTRAMUSCULAR; INTRAVENOUS EVERY 4 HOURS PRN
Status: DISCONTINUED | OUTPATIENT
Start: 2023-05-17 | End: 2023-05-17 | Stop reason: HOSPADM

## 2023-05-17 RX ORDER — HYDRALAZINE HYDROCHLORIDE 20 MG/ML
10 INJECTION INTRAMUSCULAR; INTRAVENOUS
Status: COMPLETED | OUTPATIENT
Start: 2023-05-17 | End: 2023-05-17

## 2023-05-17 RX ADMIN — SODIUM CHLORIDE 1000 ML: 9 INJECTION, SOLUTION INTRAVENOUS at 12:05

## 2023-05-17 RX ADMIN — HYDRALAZINE HYDROCHLORIDE 10 MG: 20 INJECTION INTRAMUSCULAR; INTRAVENOUS at 12:05

## 2023-05-17 RX ADMIN — ONDANSETRON 4 MG: 2 INJECTION INTRAMUSCULAR; INTRAVENOUS at 12:05

## 2023-05-17 NOTE — Clinical Note
"Janell"Melvin Cueva was seen and treated in our emergency department on 5/17/2023.  She may return to work on 05/18/2023.       If you have any questions or concerns, please don't hesitate to call.      Beau Noonan MD"

## 2023-05-17 NOTE — ED PROVIDER NOTES
EKG is performed at 12:19 p.m. normal sinus rhythm normal EKG Encounter Date: 2023       History     Chief Complaint   Patient presents with    Hypertension     Pt states she want to walk in clinic because she had vomiting and diarrhea since yesterday but was referred to er because her BP was high. No hx of hypertension.     Mr. Cueva said that is starting yesterday late last night nausea and vomiting all night with diarrhea all night has not had diarrhea or vomiting since 9:00 a.m. this morning comes to emergency department went to the walk-in clinic to try to get treated but her blood pressure was so high they just told her to go straight to the emergency department.  Patient tells everybody she does not have a history of blood pressure but when I asked her about the losartan that she was prescribed 2 years ago she said yes I had high blood pressure but they told me just to stop the medication that I did needed anymore.  Asked her about weight loss and then weight gain maybe she got off of it because she lost weight she said no everything exactly the same they just told me I did not need anymore.      Lives with her  nobody else is sick at the house.  She is not have any history of abdominal problems.  She denies tobacco alcohol or drugs.  She is had her flu shot or COVID shots tetanus shot no pneumonia vaccines.  She has no primary care doctor.  Last cycle was 1 month ago she is a  2 para 2 both delivered by .  She is employed.  She said she has no known drug allergies.  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      She has no current medications for any problems.    mom is  does not know what she  from dad is alive he has strokes and hypertension.    Review of patient's allergies indicates:  No Known Allergies  History  reviewed. No pertinent past medical history.  History reviewed. No pertinent surgical history.  History reviewed. No pertinent family history.     Review of Systems   Constitutional: Negative.    HENT: Negative.     Eyes: Negative.    Respiratory: Negative.     Cardiovascular: Negative.    Gastrointestinal:  Positive for diarrhea, nausea and vomiting.   Endocrine: Negative.    Genitourinary: Negative.    Musculoskeletal: Negative.    Skin: Negative.    Allergic/Immunologic: Negative.    Neurological: Negative.    Hematological: Negative.    Psychiatric/Behavioral: Negative.     All other systems reviewed and are negative.    Physical Exam     Initial Vitals [05/17/23 1120]   BP Pulse Resp Temp SpO2   (!) 184/118 78 18 98 °F (36.7 °C) 100 %      MAP       --         Physical Exam    Nursing note and vitals reviewed.  Constitutional: She appears well-developed and well-nourished.   Benign-appearing nontoxic ambulatory to the bathroom fully awake and oriented   HENT:   Head: Normocephalic and atraumatic.   Right Ear: Tympanic membrane and external ear normal.   Left Ear: Tympanic membrane and external ear normal.   Nose: Nose normal.   Mouth/Throat: Oropharynx is clear and moist and mucous membranes are normal.   Eyes: EOM are normal. Pupils are equal, round, and reactive to light.   Neck: Neck supple. No thyromegaly present. No tracheal deviation present. No JVD present.   Normal range of motion.  Cardiovascular:  Normal rate, regular rhythm and normal heart sounds.     Exam reveals no gallop and no friction rub.       No murmur heard.  Pulmonary/Chest: Breath sounds normal. No stridor. No respiratory distress. She has no wheezes. She has no rhonchi. She has no rales. She exhibits no tenderness.   Abdominal: Abdomen is soft. Bowel sounds are normal. She exhibits no distension and no mass. There is no abdominal tenderness.   Nontender abdomen no hepatosplenomegaly no masses normoactive bowel sounds all quadrants    Genitourinary:    Genitourinary Comments: No CVA tenderness     Musculoskeletal:         General: No tenderness or edema. Normal range of motion.      Cervical back: Normal range of motion and neck supple.     Lymphadenopathy:     She has no cervical adenopathy.   Neurological: She is alert and oriented to person, place, and time. She has normal strength and normal reflexes. No cranial nerve deficit. GCS score is 15. GCS eye subscore is 4. GCS verbal subscore is 5. GCS motor subscore is 6.   Skin: Skin is warm and dry. Capillary refill takes 2 to 3 seconds. No rash noted.   Psychiatric: She has a normal mood and affect. Her behavior is normal. Judgment and thought content normal.       ED Course   Procedures  Labs Reviewed   COMPREHENSIVE METABOLIC PANEL - Abnormal; Notable for the following components:       Result Value    Sodium Level 135 (*)     Carbon Dioxide 21 (*)     Glucose Level 111 (*)     Albumin Level 3.1 (*)     Globulin 4.4 (*)     Albumin/Globulin Ratio 0.7 (*)     All other components within normal limits   URINALYSIS, REFLEX TO URINE CULTURE - Abnormal; Notable for the following components:    Glucose, UA Trace (*)     Blood, UA Trace-Lysed (*)     All other components within normal limits   CBC WITH DIFFERENTIAL - Abnormal; Notable for the following components:    Hgb 10.2 (*)     Hct 34.1 (*)     MCV 72.1 (*)     MCH 21.6 (*)     MCHC 29.9 (*)     RDW 17.4 (*)     Platelet 424 (*)     All other components within normal limits   URINALYSIS, MICROSCOPIC - Abnormal; Notable for the following components:    Squamous Epithelial Cells, UA Moderate (*)     All other components within normal limits   BLOOD SMEAR MICROSCOPIC EXAM ([facility]) - Abnormal; Notable for the following components:    RBC Morph Abnormal (*)     Anisocyte Slight (*)     Microcyte Slight (*)     Poik Slight (*)     All other components within normal limits   TROPONIN I - Normal   CBC W/ AUTO DIFFERENTIAL    Narrative:     The following  orders were created for panel order CBC auto differential.  Procedure                               Abnormality         Status                     ---------                               -----------         ------                     CBC with Differential[[id number]]        Abnormal            Final result                 Please view results for these tests on the individual orders.     EKG Readings: (Independently Interpreted)   Initial Reading: No STEMI. Rhythm: Normal Sinus Rhythm. Heart Rate: 64. Ectopy: No Ectopy. Conduction: Normal. ST Segments: Normal ST Segments. T Waves: Normal. Clinical Impression: Normal Sinus Rhythm   1219 EKG is performed normal sinus rhythm normal ECG heart rate is 64 beats per minute   ECG Results              EKG 12-lead (Final result)  Result time 05/17/23 14:34:18      Final result by Interface, Lab In [facility] (05/17/23 14:34:18)                   Narrative:    Test Reason : I10,    Vent. Rate : 064 BPM     Atrial Rate : 064 BPM     P-R Int : 138 ms          QRS Dur : 074 ms      QT Int : 426 ms       P-R-T Axes : 013 025 056 degrees     QTc Int : 439 ms    Normal sinus rhythm  Normal ECG  No previous ECGs available  Confirmed by Markell Mccord MD ([id number]) on 5/17/2023 2:34:08 PM    Referred By: [email or web address]   SELF           Confirmed By:Markell Mccord MD                                  Imaging Results    None          Medications   ondansetron injection 4 mg (4 mg Intravenous Given 5/17/23 1232)   hydrALAZINE injection 10 mg (10 mg Intravenous Given 5/17/23 1231)   sodium chloride 0.9% bolus 1,000 mL 1,000 mL (0 mLs Intravenous Stopped 5/17/23 1331)     Medical Decision Making:   Initial Assessment:   Nausea vomiting and diarrhea all night  Hydrated appearing however heart is regular rate and rhythm non tachycardic lungs are clear abdomen is benign blood pressure noted to be very elevated  Differential Diagnosis:   Gastroenteritis with nausea vomiting and diarrhea viral versus  bacterial.  Elevation of blood pressure.  Possibly chronic hypertension off medications for unknown reasons.  Food poisoning is a possibility but she has not eaten anything different than her  and he is not having any symptoms.  No one of her coworkers have any symptoms.  Clinical Tests:   Lab Tests: Ordered and Reviewed       <> Summary of Lab: Hemoglobin hematocrit 10.2 and 34 white count 8.1.  Sodium 135 bicarb 21 glucose 111 no gross urinary infection  ED Management:  Blood pressure came down very nicely with 1 dose of hydralazine I asked the patient about the losartan she was taken 2 years ago she said it agreed with her my put her back on half the doses blood pressures come all the way down to 115 now.  Asked her to please make sure blood pressures rechecked within a month.  She voiced her understanding  MDM  Problems addressed  Co-morbidities and/or factors adding to the complexity or risk for the patient:  None known  Problems addressed:  Nausea vomiting diarrhea and elevated blood pressure  Acute problem/illness or progression/exacerbation of chronic problem with potential threat to life/bodily dysfunction?:  Possible chronic hypertension that is not been treated for 2 years  Differential diagnoses/problems considered: see above     Amount and/or Complexity of Data Reviewed  Independent Historian: none (see above for summary)  External Data Reviewed: notes from previous ED visits prior EKGs (see above for summary)  Risk and benefits of testing: discussed   Labs: Labs: ordered and reviewed  Radiology:Radiology: ordered and independent interpretation performed (see above or ED course)  ECG/medicine tests:Radiology: ordered and independent interpretation performed (see above or ED course)  none    Risk  Parenteral controlled substances   Shared decision-making  and prescription drugs  Critical Care  none            ED Course as of 05/17/23 1454   Wed May 17, 2023   1453 Blood pressure is much improved  the time of discharge patient is released  at bedside [DM]      ED Course User Index  [DM] Beau Noonan MD                 Clinical Impression:   Final diagnoses:  [I10] Hypertension  [I10] Hypertension, unspecified type (Primary)  [R11.2, R19.7] Nausea vomiting and diarrhea        ED Disposition Condition    Discharge Stable          ED Prescriptions       Medication Sig Dispense Start Date End Date Auth. Provider    losartan (COZAAR) 25 MG tablet Take 0.5 tablets (12.5 mg total) by mouth once daily. 15 tablet 5/17/2023 -- Beau Noonan MD    ondansetron (ZOFRAN-ODT) 4 MG TbDL Take 1 tablet (4 mg total) by mouth every 6 (six) hours as needed. 12 tablet 5/17/2023 -- Beau Noonan MD          Follow-up Information    None          Beau Noonan MD  05/17/23 2079

## 2023-06-24 ENCOUNTER — HOSPITAL ENCOUNTER (EMERGENCY)
Facility: HOSPITAL | Age: 43
Discharge: HOME | End: 2023-06-24
Attending: EMERGENCY MEDICINE
Payer: MEDICAID

## 2023-06-24 VITALS
SYSTOLIC BLOOD PRESSURE: 194 MMHG | TEMPERATURE: 98 F | HEART RATE: 67 BPM | DIASTOLIC BLOOD PRESSURE: 109 MMHG | BODY MASS INDEX: 43.15 KG/M2 | RESPIRATION RATE: 18 BRPM | WEIGHT: 200 LBS | HEIGHT: 57 IN | OXYGEN SATURATION: 100 %

## 2023-06-24 DIAGNOSIS — I10 HYPERTENSION, UNSPECIFIED TYPE: ICD-10-CM

## 2023-06-24 DIAGNOSIS — S89.92XA LEFT KNEE INJURY, INITIAL ENCOUNTER: ICD-10-CM

## 2023-06-24 DIAGNOSIS — S86.912A KNEE STRAIN, LEFT, INITIAL ENCOUNTER: Primary | ICD-10-CM

## 2023-06-24 PROCEDURE — 99284 EMERGENCY DEPT VISIT MOD MDM: CPT

## 2023-06-24 PROCEDURE — 96372 THER/PROPH/DIAG INJ SC/IM: CPT | Performed by: NURSE PRACTITIONER

## 2023-06-24 PROCEDURE — 63600175 PHARM REV CODE 636 W HCPCS: Performed by: NURSE PRACTITIONER

## 2023-06-24 RX ORDER — CLONIDINE HYDROCHLORIDE 0.1 MG/1
0.1 TABLET ORAL EVERY 6 HOURS PRN
Qty: 30 TABLET | Refills: 0 | Status: SHIPPED | OUTPATIENT
Start: 2023-06-24 | End: 2023-07-08

## 2023-06-24 RX ORDER — IBUPROFEN 600 MG/1
600 TABLET ORAL EVERY 6 HOURS PRN
Qty: 20 TABLET | Refills: 0 | Status: SHIPPED | OUTPATIENT
Start: 2023-06-24

## 2023-06-24 RX ORDER — TRAMADOL HYDROCHLORIDE 50 MG/1
50 TABLET ORAL EVERY 6 HOURS PRN
Qty: 16 TABLET | Refills: 0 | Status: SHIPPED | OUTPATIENT
Start: 2023-06-24 | End: 2023-06-28

## 2023-06-24 RX ORDER — BETAMETHASONE SODIUM PHOSPHATE AND BETAMETHASONE ACETATE 3; 3 MG/ML; MG/ML
6 INJECTION, SUSPENSION INTRA-ARTICULAR; INTRALESIONAL; INTRAMUSCULAR; SOFT TISSUE
Status: COMPLETED | OUTPATIENT
Start: 2023-06-24 | End: 2023-06-24

## 2023-06-24 RX ORDER — KETOROLAC TROMETHAMINE 30 MG/ML
60 INJECTION, SOLUTION INTRAMUSCULAR; INTRAVENOUS
Status: COMPLETED | OUTPATIENT
Start: 2023-06-24 | End: 2023-06-24

## 2023-06-24 RX ADMIN — BETAMETHASONE SODIUM PHOSPHATE AND BETAMETHASONE ACETATE 6 MG: 3; 3 INJECTION, SUSPENSION INTRA-ARTICULAR; INTRALESIONAL; INTRAMUSCULAR at 12:06

## 2023-06-24 RX ADMIN — KETOROLAC TROMETHAMINE 60 MG: 30 INJECTION, SOLUTION INTRAMUSCULAR at 12:06

## 2023-06-24 NOTE — ED PROVIDER NOTES
Encounter Date: 2023       History     Chief Complaint   Patient presents with    Knee Pain     Pt c/o left knee pain x one week, states she injured it a week ago when she heard a pop in her knee.     Patient is a 42-year-old female with significant history of obesity and hypertension who presents the emergency department for evaluation and treatment of left knee pain, onset 1 week ago, she states that she was weight in a vehicle with her sleeping grandson and that he began to cry so she went gotten out of the backseat when she stepped back up into her truck she pivoted to get in the vehicle and she felt her left knee pop, she states that she had to go to work that night so she could rest her knee appropriately after the initial injury, states since the injury that her knee is very painful, very swollen, she is able to bear weight, there is no deformity, no ecchymosis, no other symptoms or conditions.  Incidentally she was also found to be very hypertensive in the ED, asymptomatic, she was seen in this ER about a month ago for the same situation, states that she followed up appropriately and now takes her blood pressure medication on daily basis but states that she feels like the increase in her pressure today is due to the pain in her knee.  States that she has taken her blood pressure on an outpatient basis med is never this elevated since starting her medication.    Review of patient's allergies indicates:  No Known Allergies  Past Medical History:   Diagnosis Date    Congenital malformation of heart, unspecified     Essential (primary) hypertension     Migraines     Obesity, unspecified     Sacroiliitis      Past Surgical History:   Procedure Laterality Date    BILATERAL TUBAL LIGATION       SECTION       SECTION      CHOLECYSTECTOMY      RIGHT HEART CATHETERIZATION FOR CONGENITAL HEART DEFECT       Family History   Problem Relation Age of Onset    No Known Problems Mother     Hypertension  Father     Stroke Father         Review of Systems   All other systems reviewed and are negative.    Physical Exam     Initial Vitals [06/24/23 1032]   BP Pulse Resp Temp SpO2   (!) 195/98 87 18 97.8 °F (36.6 °C) 99 %      MAP       --         Physical Exam    Nursing note and vitals reviewed.  Constitutional: She appears well-developed and well-nourished.   HENT:   Head: Normocephalic and atraumatic.   Eyes: Conjunctivae and EOM are normal. Pupils are equal, round, and reactive to light.   Neck: Neck supple.   Normal range of motion.  Cardiovascular:  Normal rate, regular rhythm and normal heart sounds.           Pulmonary/Chest: Breath sounds normal.   Musculoskeletal:      Cervical back: Normal range of motion and neck supple.     Neurological: She is alert and oriented to person, place, and time. She has normal strength. GCS score is 15. GCS eye subscore is 4. GCS verbal subscore is 5. GCS motor subscore is 6.   Skin: Skin is warm and dry.   Psychiatric: She has a normal mood and affect. Her behavior is normal. Judgment and thought content normal.       ED Course   Procedures  Labs Reviewed - No data to display       Imaging Results              X-Ray Knee 3 View Left (Final result)  Result time 06/24/23 11:25:36      Final result by Jonathan Guillermo MD (06/24/23 11:25:36)                   Impression:      Minimal degenerative changes without evidence for fracture.  A small suprapatellar joint effusion is identified.      Electronically signed by: Jonathan Guillermo MD  Date:    06/24/2023  Time:    11:25               Narrative:    EXAMINATION:  XR KNEE 3 VIEW LEFT    CLINICAL HISTORY:  Pain, unspecified    TECHNIQUE:  AP, lateral, and Merchant views of the left knee were performed.    COMPARISON:  None    FINDINGS:  No fracture.  The alignment is normal.  Joint space narrowing is identified in the medial compartment.  Spurring of the tibial spine is identified.  Suprapatellar joint effusion is identified..                                        Medications   betamethasone acetate-betamethasone sodium phosphate injection 6 mg (6 mg Intramuscular Given 6/24/23 1252)   ketorolac injection 60 mg (60 mg Intramuscular Given 6/24/23 1252)                 ED Course as of 06/24/23 1254   Sat Jun 24, 2023   1244 I discussed at length progression of any injury, advised her that if is not improved in about 2 weeks after wearing her knee immobilizer and wearing a brace of her knee immobilizer is not in place that she needs to follow-up with her PCP for an MRI if the problem persists, I discussed the need to strengthen her left quadriceps as the strength of her knee depends on this muscle group and demonstrated exercises that she needs to do at home, medications as prescribed.  Differential diagnosis is include meniscus tear, tibial plateau fracture, ligament injury, internal knee derangement, left knee strain    Her x-ray results show no acute fractures, there is some edema noted with some joint space narrowing, no acute findings [EB]      ED Course User Index  [EB] SHAHZAD Bojorquez                 Clinical Impression:   Final diagnoses:  [S86.912A] Knee strain, left, initial encounter (Primary)  [I10] Hypertension, unspecified type  [S89.92XA] Left knee injury, initial encounter        ED Disposition Condition    Discharge Stable          ED Prescriptions       Medication Sig Dispense Start Date End Date Auth. Provider    traMADoL (ULTRAM) 50 mg tablet Take 1 tablet (50 mg total) by mouth every 6 (six) hours as needed for Pain. 16 tablet 6/24/2023 6/28/2023 SHAHZAD Bojorquez    ibuprofen (ADVIL,MOTRIN) 600 MG tablet Take 1 tablet (600 mg total) by mouth every 6 (six) hours as needed for Pain. 20 tablet 6/24/2023 -- SHAHZAD Bojorquez    cloNIDine (CATAPRES) 0.1 MG tablet Take 1 tablet (0.1 mg total) by mouth every 6 (six) hours as needed (systolic blood pressure greater than 160). 30 tablet 6/24/2023 7/8/2023 Claire REEDER  SHAHZAD Mancia          Follow-up Information       Follow up With Specialties Details Why Contact Info    [facility] - Emergency Dept Emergency Medicine  As needed, If symptoms worsen [address]  [facility] [id number]  [phone]             SHAHZAD Bojorquez  06/24/23 8252

## 2024-04-10 ENCOUNTER — OFFICE VISIT (OUTPATIENT)
Dept: INTERNAL MEDICINE | Facility: CLINIC | Age: 44
End: 2024-04-10
Payer: MEDICAID

## 2024-04-10 VITALS
RESPIRATION RATE: 18 BRPM | HEIGHT: 57 IN | TEMPERATURE: 98 F | SYSTOLIC BLOOD PRESSURE: 150 MMHG | DIASTOLIC BLOOD PRESSURE: 88 MMHG | OXYGEN SATURATION: 98 % | WEIGHT: 207 LBS | BODY MASS INDEX: 44.66 KG/M2 | HEART RATE: 85 BPM

## 2024-04-10 DIAGNOSIS — Z12.4 CERVICAL CANCER SCREENING: ICD-10-CM

## 2024-04-10 DIAGNOSIS — Z12.31 ENCOUNTER FOR SCREENING MAMMOGRAM FOR MALIGNANT NEOPLASM OF BREAST: ICD-10-CM

## 2024-04-10 DIAGNOSIS — Z12.11 ENCOUNTER FOR COLORECTAL CANCER SCREENING: ICD-10-CM

## 2024-04-10 DIAGNOSIS — E66.01 CLASS 3 SEVERE OBESITY DUE TO EXCESS CALORIES WITH SERIOUS COMORBIDITY AND BODY MASS INDEX (BMI) OF 40.0 TO 44.9 IN ADULT: Chronic | ICD-10-CM

## 2024-04-10 DIAGNOSIS — R05.1 ACUTE COUGH: ICD-10-CM

## 2024-04-10 DIAGNOSIS — Z12.12 ENCOUNTER FOR COLORECTAL CANCER SCREENING: ICD-10-CM

## 2024-04-10 DIAGNOSIS — I10 PRIMARY HYPERTENSION: Primary | Chronic | ICD-10-CM

## 2024-04-10 DIAGNOSIS — Z00.00 WELLNESS EXAMINATION: ICD-10-CM

## 2024-04-10 PROBLEM — E66.813 CLASS 3 SEVERE OBESITY DUE TO EXCESS CALORIES WITH SERIOUS COMORBIDITY AND BODY MASS INDEX (BMI) OF 40.0 TO 44.9 IN ADULT: Chronic | Status: ACTIVE | Noted: 2024-04-10

## 2024-04-10 PROCEDURE — 1159F MED LIST DOCD IN RCRD: CPT | Mod: CPTII,,, | Performed by: NURSE PRACTITIONER

## 2024-04-10 PROCEDURE — 3008F BODY MASS INDEX DOCD: CPT | Mod: CPTII,,, | Performed by: NURSE PRACTITIONER

## 2024-04-10 PROCEDURE — 3077F SYST BP >= 140 MM HG: CPT | Mod: CPTII,,, | Performed by: NURSE PRACTITIONER

## 2024-04-10 PROCEDURE — 3079F DIAST BP 80-89 MM HG: CPT | Mod: CPTII,,, | Performed by: NURSE PRACTITIONER

## 2024-04-10 PROCEDURE — 1160F RVW MEDS BY RX/DR IN RCRD: CPT | Mod: CPTII,,, | Performed by: NURSE PRACTITIONER

## 2024-04-10 PROCEDURE — 99215 OFFICE O/P EST HI 40 MIN: CPT | Mod: PBBFAC | Performed by: NURSE PRACTITIONER

## 2024-04-10 PROCEDURE — 99204 OFFICE O/P NEW MOD 45 MIN: CPT | Mod: S$PBB,,, | Performed by: NURSE PRACTITIONER

## 2024-04-10 RX ORDER — ALBUTEROL SULFATE 90 UG/1
2 AEROSOL, METERED RESPIRATORY (INHALATION) EVERY 6 HOURS
COMMUNITY
End: 2024-04-10 | Stop reason: SDUPTHER

## 2024-04-10 RX ORDER — LOSARTAN POTASSIUM 25 MG/1
25 TABLET ORAL DAILY
Qty: 90 TABLET | Refills: 1 | Status: SHIPPED | OUTPATIENT
Start: 2024-04-10

## 2024-04-10 RX ORDER — BENZONATATE 200 MG/1
200 CAPSULE ORAL 3 TIMES DAILY PRN
Qty: 15 CAPSULE | Refills: 1 | Status: SHIPPED | OUTPATIENT
Start: 2024-04-10 | End: 2024-05-03

## 2024-04-10 RX ORDER — ALBUTEROL SULFATE 90 UG/1
2 AEROSOL, METERED RESPIRATORY (INHALATION) EVERY 6 HOURS
Qty: 8 G | Refills: 0 | Status: SHIPPED | OUTPATIENT
Start: 2024-04-10

## 2024-04-10 NOTE — PROGRESS NOTES
Patient ID: [id number]     Chief Complaint: Establish Care, Hypertension (1. Out of medication 2. Cough for 2 weeks wheezing at night), and Cough    HPI:     Janell Cueva is a 43 y.o. female with diagnosis of HTN, Obesity. Patient seen in clinic today to establish care.   Patient states she was diagnosed with bronchitis 2 weeks ago, was prescribed medication but still having cough with mild wheeze at times. Patient denies chest pain, SOB, fever, chills.   Patient also diagnosed with HTN, was prescribed Losartan 12.5 mg daily and Clonidine 0.1 mg prn. Patient states she has been out of medication for 5-6 months due to losing her insurance. Patient states she does not have a B/P monitor at home so unable to check her B/P. B/P today 150/88. Patient denies headache, change in vision, SOB, chest pain.   Patient denies any other acute complaints.       Review of patient's allergies indicates:  No Known Allergies    Breast Cancer Screening: MMG ordered 04/10/2024  Cervical Cancer Screening: GYN referral ordered 04/10/2024  Colorectal Cancer Screening: Cologuard ordered 04/10/2024  Diabetic Eye Exam: N/A  Diabetic Foot Exam: N/A  Lung Cancer Screening: N/A  Prostate Cancer Screening: N/A  AAA Screening: N/A  Osteoporosis Screening: deferred due to age  Medicare Wellness: N/A  Immunizations:   There is no immunization history on file for this patient.    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    Social History     Socioeconomic History    Marital status:     Number of children: 2   Tobacco Use    Smoking status: Never    Smokeless tobacco: Never   Substance and Sexual Activity    Alcohol  use: Yes     Comment: occ    Drug use: Never    Sexual activity: Yes     Partners: Male     Social Determinants of Health     Financial Resource Strain: Low Risk  (4/10/2024)    Overall Financial Resource Strain (CARDIA)     Difficulty of Paying Living Expenses: Not hard at all   Food Insecurity: Food Insecurity Present (4/10/2024)    Hunger Vital Sign     Worried About Running Out of Food in the Last Year: Sometimes true     Ran Out of Food in the Last Year: Sometimes true   Transportation Needs: Unmet Transportation Needs (4/10/2024)    PRAPARE - Transportation     Lack of Transportation (Medical): Yes     Lack of Transportation (Non-Medical): Yes   Physical Activity: Insufficiently Active (4/10/2024)    Exercise Vital Sign     Days of Exercise per Week: 2 days     Minutes of Exercise per Session: 30 min   Stress: No Stress Concern Present (4/10/2024)    [employer] [place] [employer] - Occupational Stress Questionnaire     Feeling of Stress : Not at all   Social Connections: Moderately Isolated (4/10/2024)    Social Connection and Isolation Panel [NHANES]     Frequency of Communication with Friends and Family: More than three times a week     Frequency of Social Gatherings with Friends and Family: Twice a week     Attends Baptism Services: Never     Active Member of Clubs or Organizations: No     Attends Club or Organization Meetings: Never     Marital Status:    Housing Stability: Unknown (4/10/2024)    Housing Stability Vital Sign     Unable to Pay for Housing in the Last Year: No     Number of Places Lived in the Last Year: 1     Current Outpatient Medications   Medication Instructions    albuterol (PROVENTIL/VENTOLIN HFA) 90 mcg/actuation inhaler 2 puffs, Inhalation, Every 6 hours    benzonatate (TESSALON) 200 mg, Oral, 3 times daily PRN    ibuprofen (ADVIL,MOTRIN) 600 mg, Oral, Every 6 hours PRN    losartan (COZAAR) 25 mg, Oral, Daily       Subjective:     Review of Systems  "  Constitutional: Negative.    HENT: Negative.     Eyes: Negative.    Respiratory:  Positive for cough. Negative for shortness of breath.    Cardiovascular: Negative.  Negative for chest pain.   Gastrointestinal: Negative.    Endocrine: Negative.    Genitourinary: Negative.    Musculoskeletal: Negative.    Skin: Negative.    Allergic/Immunologic: Negative.    Neurological: Negative.    Hematological: Negative.    Psychiatric/Behavioral: Negative.         Objective:     Visit Vitals  BP (!) 150/88 (BP Location: Right arm, Patient Position: Sitting, BP Method: Large (Manual))   Pulse 85   Temp 97.9 °F (36.6 °C) (Oral)   Resp 18   Ht 4' 9.01" (1.448 m)   Wt 93.9 kg (207 lb)   SpO2 98%   BMI 44.78 kg/m²       Physical Exam  Vitals reviewed.   Constitutional:       Appearance: Normal appearance. She is obese.   HENT:      Head: Normocephalic and atraumatic.      Mouth/Throat:      Mouth: Mucous membranes are moist.      Pharynx: Oropharynx is clear.   Eyes:      Extraocular Movements: Extraocular movements intact.      Conjunctiva/sclera: Conjunctivae normal.      Pupils: Pupils are equal, round, and reactive to light.   Cardiovascular:      Rate and Rhythm: Normal rate and regular rhythm.      Heart sounds: Normal heart sounds.   Pulmonary:      Effort: Pulmonary effort is normal.      Breath sounds: Normal breath sounds.   Abdominal:      General: Bowel sounds are normal.   Musculoskeletal:         General: Normal range of motion.      Cervical back: Normal range of motion.   Skin:     General: Skin is warm and dry.   Neurological:      Mental Status: She is alert and oriented to person, place, and time.   Psychiatric:         Mood and Affect: Mood normal.         Behavior: Behavior normal.       Labs Reviewed:     Hematology:  Lab Results   Component Value Date    WBC 8.10 05/17/2023    HGB 10.2 (L) 05/17/2023    HCT 34.1 (L) 05/17/2023     (H) 05/17/2023     Chemistry:  Lab Results   Component Value Date    NA " 135 (L) 05/17/2023    K 3.6 05/17/2023    CHLORIDE 105 05/17/2023    BUN 9.0 05/17/2023    CREATININE 0.85 05/17/2023    EGFRNORACEVR >60 05/17/2023    GLUCOSE 111 (H) 05/17/2023    CALCIUM 9.0 05/17/2023    ALKPHOS 106 05/17/2023    LABPROT 7.5 05/17/2023    ALBUMIN 3.1 (L) 05/17/2023    BILIDIR 0.2 11/10/2021    IBILI 0.20 11/10/2021    AST 13 05/17/2023    ALT 11 05/17/2023    [id number] 9.3 (L) 11/10/2021     Lab Results   Component Value Date    HGBA1C 5.1 11/10/2021     Lipid Panel:  Lab Results   Component Value Date    CHOL 156 11/10/2021    HDL 50 11/10/2021    LDL 90.00 11/10/2021    TRIG 80 11/10/2021    TOTALCHOLEST 3 11/10/2021     Thyroid:  Lab Results   Component Value Date    TSH 1.5833 11/10/2021     Urine:  Lab Results   Component Value Date    COLORUA Yellow 05/17/2023    APPEARANCEUA Clear 05/17/2023    SGUA 1.015 05/17/2023    PHUA 6.0 05/17/2023    PROTEINUA Negative 05/17/2023    GLUCOSEUA Trace (A) 05/17/2023    KETONESUA Negative 05/17/2023    BLOODUA Trace-Lysed (A) 05/17/2023    NITRITESUA Negative 05/17/2023    LEUKOCYTESUR Negative 05/17/2023    RBCUA 0-2 05/17/2023    WBCUA None Seen 05/17/2023    BACTERIA None Seen 05/17/2023    SQEPUA  (A) 11/10/2021    HYALINECASTS 0-2 (A) 11/10/2021        Assessment:       ICD-10-CM ICD-9-CM   1. Primary hypertension  I10 401.9   2. Acute cough  R05.1 786.2   3. Cervical cancer screening  Z12.4 V76.2   4. Encounter for colorectal cancer screening  Z12.11 V76.51    Z12.12 V76.41   5. Encounter for screening mammogram for malignant neoplasm of breast  Z12.31 V76.12   6. Class 3 severe obesity due to excess calories with serious comorbidity and body mass index (BMI) of 40.0 to 44.9 in adult  E66.01 278.01    Z68.41 V85.41   7. Wellness examination  Z00.00 V70.0        Plan:     1. Primary hypertension  Vitals:    04/10/24 1241   BP: (!) 150/88   Pulse:    Resp:    Temp:       No results found for this or any previous visit.  Results for orders  placed or performed during the hospital encounter of 05/17/23   EKG 12-lead    Collection Time: 05/17/23 12:19 PM    Narrative    Test Reason : I10,    Vent. Rate : 064 BPM     Atrial Rate : 064 BPM     P-R Int : 138 ms          QRS Dur : 074 ms      QT Int : 426 ms       P-R-T Axes : 013 025 056 degrees     QTc Int : 439 ms    Normal sinus rhythm  Normal ECG  No previous ECGs available  Confirmed by Markell Mccord MD ([id number]) on 5/17/2023 2:34:08 PM    Referred By: YOHANA   SELF           Confirmed By:Markell Mccord MD   Start Losartan 25 mg daily  DASH diet  Encouraged home blood pressure monitoring    2. Acute cough  Diagnosed with bronchitis 2 weeks ago. Non-productive cough.   Start benzonatate 200 mg tid prn    3. Cervical cancer screening  - Ambulatory referral/consult to Gynecology; Future    4. Encounter for colorectal cancer screening  - Cologuard Screening (Multitarget Stool DNA); Future  - Cologuard Screening (Multitarget Stool DNA)    5. Encounter for screening mammogram for malignant neoplasm of breast  - Mammo Digital Screening Bilat w/ Thanh; Future    6. Class 3 severe obesity due to excess calories with serious comorbidity and body mass index (BMI) of 40.0 to 44.9 in adult  Body mass index is 44.78 kg/m².  TSH   Date Value Ref Range Status   11/10/2021 1.5833 0.3500 - 4.9400 uIU/mL Final     Vit D 25 OH   Date Value Ref Range Status   11/10/2021 9.3 (L) 30.0 - 80.0 ng/mL Final     Hemoglobin A1c   Date Value Ref Range Status   11/10/2021 5.1 <<=7.0 % Final   Sleep Study: none noted  Weight Loss Encouraged  Increase Physical Activity    7. Wellness examination  - CBC Auto Differential; Future  - Comprehensive Metabolic Panel; Future  - Hemoglobin A1C; Future  - Lipid Panel; Future  - Urinalysis; Future  - Microalbumin/Creatinine Ratio, Urine; Future  - TSH; Future  - Vitamin D; Future        Follow up in about 2 weeks (around 4/24/2024) for Labs, Virtual Visit. In addition to their scheduled follow  up, the patient has also been instructed to follow up on as needed basis.     Demetrice Sanchez, LIDIAP

## 2024-04-11 ENCOUNTER — PATIENT OUTREACH (OUTPATIENT)
Dept: ADMINISTRATIVE | Facility: OTHER | Age: 44
End: 2024-04-11
Payer: MEDICAID

## 2024-04-11 RX ORDER — ACETAMINOPHEN 500 MG
1 TABLET ORAL 2 TIMES DAILY
Qty: 1 EACH | Refills: 0 | Status: SHIPPED | OUTPATIENT
Start: 2024-04-11

## 2024-04-24 ENCOUNTER — APPOINTMENT (OUTPATIENT)
Dept: LAB | Facility: HOSPITAL | Age: 44
End: 2024-04-24
Attending: NURSE PRACTITIONER
Payer: MEDICAID

## 2024-04-24 LAB — NONINV COLON CA DNA+OCC BLD SCRN STL QL: NEGATIVE

## 2024-05-03 ENCOUNTER — OFFICE VISIT (OUTPATIENT)
Dept: INTERNAL MEDICINE | Facility: CLINIC | Age: 44
End: 2024-05-03
Payer: MEDICAID

## 2024-05-03 DIAGNOSIS — E55.9 VITAMIN D DEFICIENCY: ICD-10-CM

## 2024-05-03 DIAGNOSIS — I10 PRIMARY HYPERTENSION: Primary | Chronic | ICD-10-CM

## 2024-05-03 DIAGNOSIS — D64.9 ANEMIA, UNSPECIFIED TYPE: ICD-10-CM

## 2024-05-03 PROCEDURE — 3060F POS MICROALBUMINURIA REV: CPT | Mod: CPTII,95,, | Performed by: NURSE PRACTITIONER

## 2024-05-03 PROCEDURE — 1159F MED LIST DOCD IN RCRD: CPT | Mod: CPTII,95,, | Performed by: NURSE PRACTITIONER

## 2024-05-03 PROCEDURE — 4010F ACE/ARB THERAPY RXD/TAKEN: CPT | Mod: CPTII,95,, | Performed by: NURSE PRACTITIONER

## 2024-05-03 PROCEDURE — 1160F RVW MEDS BY RX/DR IN RCRD: CPT | Mod: CPTII,95,, | Performed by: NURSE PRACTITIONER

## 2024-05-03 PROCEDURE — 99214 OFFICE O/P EST MOD 30 MIN: CPT | Mod: 25,95,, | Performed by: NURSE PRACTITIONER

## 2024-05-03 PROCEDURE — 3066F NEPHROPATHY DOC TX: CPT | Mod: CPTII,95,, | Performed by: NURSE PRACTITIONER

## 2024-05-03 PROCEDURE — 3044F HG A1C LEVEL LT 7.0%: CPT | Mod: CPTII,95,, | Performed by: NURSE PRACTITIONER

## 2024-05-03 RX ORDER — ERGOCALCIFEROL 1.25 MG/1
50000 CAPSULE ORAL
Qty: 12 CAPSULE | Refills: 2 | Status: SHIPPED | OUTPATIENT
Start: 2024-05-03

## 2024-05-03 NOTE — PROGRESS NOTES
Patient ID: [id number]     Chief Complaint: Follow-up and Results    HPI:     The patient location is: home  The chief complaint leading to consultation is: follow up    Visit type: audiovisual    Face to Face time with patient: 10 minutes  10 minutes of total time spent on the encounter, which includes face to face time and non-face to face time preparing to see the patient (eg, review of tests), Obtaining and/or reviewing separately obtained history, Documenting clinical information in the electronic or other health record, Independently interpreting results (not separately reported) and communicating results to the patient/family/caregiver, or Care coordination (not separately reported).     Each patient to whom he or she provides medical services by telemedicine is:  (1) informed of the relationship between the physician and patient and the respective role of any other health care provider with respect to management of the patient; and (2) notified that he or she may decline to receive medical services by telemedicine and may withdraw from such care at any time.      Janell Cueva is a 43 y.o. female with diagnosis of HTN, Obesity. Patient seen in clinic today for follow up. Patient last seen on 04/10/2024.   At previous appt, patient restarted on her Losartan 12.5 mg daily for HTN. Patient states she doesn't think her B/P monitor is working so will bring to f/u appointment for us to look at. Patient denies chest pain/SOB.   Patient denies any other acute complaints.       Review of patient's allergies indicates:  No Known Allergies    Breast Cancer Screening: MMG ordered 04/10/2024  Cervical Cancer Screening: GYN scheduled 12/20/2024  Colorectal Cancer Screening: Cologuard negative 04/16/2024  Diabetic Eye Exam: N/A  Diabetic Foot Exam: N/A  Lung Cancer Screening: N/A  Prostate Cancer Screening: N/A  AAA Screening: N/A  Osteoporosis Screening: deferred due to age  Medicare Wellness: N/A  Immunizations:   There  is no immunization history on file for this patient.    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    Social History     Socioeconomic History    Marital status:     Number of children: 2   Tobacco Use    Smoking status: Never    Smokeless tobacco: Never   Substance and Sexual Activity    Alcohol use: Yes     Comment: occ    Drug use: Never    Sexual activity: Yes     Partners: Male     Social Determinants of Health     Financial Resource Strain: Low Risk  (2024)    Overall Financial Resource Strain (CARDIA)     Difficulty of Paying Living Expenses: Not hard at all   Food Insecurity: No Food Insecurity (2024)    Hunger Vital Sign     Worried About Running Out of Food in the Last Year: Never true     Ran Out of Food in the Last Year: Never true   Recent Concern: Food Insecurity - Food Insecurity Present (4/10/2024)    Hunger Vital Sign     Worried About Running Out of Food in the Last Year: Sometimes true     Ran Out of Food in the Last Year: Sometimes true   Transportation Needs: No Transportation Needs (2024)    PRAPARE - Transportation     Lack of Transportation (Medical): No     Lack of Transportation (Non-Medical): No   Recent Concern: Transportation Needs - Unmet Transportation Needs (4/10/2024)    PRAPARE - Transportation     Lack of Transportation (Medical): Yes     Lack of Transportation (Non-Medical): Yes   Physical Activity: Insufficiently Active (2024)    Exercise Vital Sign     Days of Exercise per Week: 2 days     Minutes of Exercise per Session: 30 min   Stress: No Stress Concern Present (2024)    [employer] [place] [employer] - Occupational  Stress Questionnaire     Feeling of Stress : Not at all   Housing Stability: Unknown (4/10/2024)    Housing Stability Vital Sign     Unable to Pay for Housing in the Last Year: No     Number of Places Lived in the Last Year: 1     Current Outpatient Medications   Medication Instructions    albuterol (PROVENTIL/VENTOLIN HFA) 90 mcg/actuation inhaler 2 puffs, Inhalation, Every 6 hours    blood pressure monitor Kit 1 each, Misc.(Non-Drug; Combo Route), 2 times daily    ergocalciferol (ERGOCALCIFEROL) 50,000 Units, Oral, Every 7 days    losartan (COZAAR) 25 mg, Oral, Daily       Subjective:     Review of Systems   Constitutional: Negative.    HENT: Negative.     Eyes: Negative.    Respiratory: Negative.     Cardiovascular: Negative.    Gastrointestinal: Negative.    Endocrine: Negative.    Genitourinary: Negative.    Musculoskeletal: Negative.    Skin: Negative.    Allergic/Immunologic: Negative.    Neurological: Negative.    Hematological: Negative.    Psychiatric/Behavioral: Negative.         Objective:     There were no vitals taken for this visit.      Physical Exam  Neurological:      Mental Status: She is alert and oriented to person, place, and time.   Psychiatric:         Mood and Affect: Mood normal.       Labs Reviewed:     Hematology:  Lab Results   Component Value Date    WBC 7.87 04/24/2024    HGB 9.2 (L) 04/24/2024    HCT 31.1 (L) 04/24/2024     (H) 04/24/2024     Chemistry:  Lab Results   Component Value Date     04/24/2024    K 4.2 04/24/2024    CHLORIDE 111 (H) 04/24/2024    BUN 12.0 04/24/2024    CREATININE 0.87 04/24/2024    EGFRNORACEVR >60 04/24/2024    GLUCOSE 91 04/24/2024    CALCIUM 8.8 04/24/2024    ALKPHOS 97 04/24/2024    LABPROT 7.8 04/24/2024    ALBUMIN 3.2 (L) 04/24/2024    BILIDIR 0.2 11/10/2021    IBILI 0.20 11/10/2021    AST 12 04/24/2024    ALT 10 04/24/2024    [id number] 9.1 (L) 04/24/2024     Lab Results   Component Value Date    HGBA1C 5.2 04/24/2024     Lipid  Panel:  Lab Results   Component Value Date    CHOL 171 04/24/2024    HDL 62 (H) 04/24/2024    LDL 93.00 04/24/2024    TRIG 78 04/24/2024    TOTALCHOLEST 3 04/24/2024     Thyroid:  Lab Results   Component Value Date    TSH 1.501 04/24/2024     Urine:  Lab Results   Component Value Date    COLORUA Light-Yellow 04/24/2024    APPEARANCEUA Clear 04/24/2024    SGUA 1.020 04/24/2024    PHUA 5.5 04/24/2024    PROTEINUA 1+ (A) 04/24/2024    GLUCOSEUA Normal 04/24/2024    KETONESUA Negative 04/24/2024    BLOODUA 1+ (A) 04/24/2024    NITRITESUA Negative 04/24/2024    LEUKOCYTESUR Negative 04/24/2024    RBCUA 6-10 (A) 04/24/2024    WBCUA 0-5 04/24/2024    BACTERIA Few (A) 04/24/2024    SQEPUA Occ (A) 04/24/2024    HYALINECASTS 3-5 (A) 04/24/2024    CREATRANDUR 111.7 (H) 04/24/2024        Assessment:       ICD-10-CM ICD-9-CM   1. Primary hypertension  I10 401.9   2. Anemia, unspecified type  D64.9 285.9   3. Vitamin D deficiency  E55.9 268.9       Plan:     1. Primary hypertension  There were no vitals filed for this visit.     No results found for this or any previous visit.  Results for orders placed or performed during the hospital encounter of 05/17/23   EKG 12-lead    Collection Time: 05/17/23 12:19 PM    Narrative    Test Reason : I10,    Vent. Rate : 064 BPM     Atrial Rate : 064 BPM     P-R Int : 138 ms          QRS Dur : 074 ms      QT Int : 426 ms       P-R-T Axes : 013 025 056 degrees     QTc Int : 439 ms    Normal sinus rhythm  Normal ECG  No previous ECGs available  Confirmed by Markell Mccord MD ([id number]) on 5/17/2023 2:34:08 PM    Referred By: [email or web address]   SELF           Confirmed By:Markell Mccord MD   Continue Losartan 25 mg daily  DASH diet  Encouraged home blood pressure monitoring    2. Anemia, unspecified type  RBC   Date Value Ref Range Status   04/24/2024 4.32 4.20 - 5.40 x10(6)/mcL Final     Hgb   Date Value Ref Range Status   04/24/2024 9.2 (L) 12.0 - 16.0 g/dL Final     Hct   Date Value Ref Range Status    04/24/2024 31.1 (L) 37.0 - 47.0 % Final     MCV   Date Value Ref Range Status   04/24/2024 72.0 (L) 80.0 - 94.0 fL Final     Iron Level   Date Value Ref Range Status   11/10/2021 19 (L) 50 - 170 ug/dL Final     Iron Binding Capacity Total   Date Value Ref Range Status   11/10/2021 403 250 - 450 ug/dL Final     Ferritin Level   Date Value Ref Range Status   11/10/2021 5.47 4.63 - 204.00 ng/mL Final   Labs ordered  Cologuard negative    3. Vitamin D deficiency  Vitamin D level: 9  Start Vitamin D 50,000 units weekly      Follow up in about 8 weeks (around 6/28/2024) for Labs. In addition to their scheduled follow up, the patient has also been instructed to follow up on as needed basis.     Demetrice Sanchez, LIDIAP

## 2024-06-06 ENCOUNTER — DOCUMENTATION ONLY (OUTPATIENT)
Facility: CLINIC | Age: 44
End: 2024-06-06
Payer: MEDICAID

## 2024-07-02 RX ORDER — ALBUTEROL SULFATE 90 UG/1
2 AEROSOL, METERED RESPIRATORY (INHALATION) EVERY 6 HOURS
Qty: 18 G | Refills: 2 | Status: SHIPPED | OUTPATIENT
Start: 2024-07-02 | End: 2025-07-02

## 2024-07-02 RX ORDER — ERGOCALCIFEROL 1.25 MG/1
50000 CAPSULE ORAL
Qty: 12 CAPSULE | Refills: 2 | OUTPATIENT
Start: 2024-07-02

## 2024-07-02 RX ORDER — LOSARTAN POTASSIUM 25 MG/1
25 TABLET ORAL DAILY
Qty: 90 TABLET | Refills: 1 | Status: SHIPPED | OUTPATIENT
Start: 2024-07-02 | End: 2025-07-02

## 2024-10-23 DIAGNOSIS — R06.2 WHEEZING: Primary | ICD-10-CM

## 2024-10-24 NOTE — TELEPHONE ENCOUNTER
Refill Routing Note   Medication(s) are not appropriate for processing by [facility] for the following reason(s):        Non-participating provider    [facility] action(s):  Route               Appointments  past 12m or future 3m with PCP    Date Provider   Last Visit   5/3/2024 Demetrice Sanchez FNP   Next Visit   Visit date not found Demetrice Sanchez FNP   ED visits in past 90 days: 0        Note composed:10:09 PM 10/23/2024

## 2024-10-26 RX ORDER — ALBUTEROL SULFATE 90 UG/1
2 INHALANT RESPIRATORY (INHALATION) EVERY 6 HOURS PRN
Qty: 18 G | Refills: 2 | Status: SHIPPED | OUTPATIENT
Start: 2024-10-26 | End: 2025-10-26

## 2024-11-25 DIAGNOSIS — R06.2 WHEEZING: ICD-10-CM

## 2024-11-26 RX ORDER — ALBUTEROL SULFATE 90 UG/1
2 INHALANT RESPIRATORY (INHALATION) EVERY 6 HOURS PRN
Qty: 18 G | Refills: 8 | Status: SHIPPED | OUTPATIENT
Start: 2024-11-26 | End: 2025-11-26

## 2024-12-19 DIAGNOSIS — R06.2 WHEEZING: ICD-10-CM

## 2024-12-20 RX ORDER — ALBUTEROL SULFATE 90 UG/1
2 INHALANT RESPIRATORY (INHALATION) EVERY 6 HOURS PRN
Qty: 18 G | Refills: 8 | OUTPATIENT
Start: 2024-12-20 | End: 2025-12-20

## 2025-02-25 DIAGNOSIS — I10 PRIMARY HYPERTENSION: Primary | Chronic | ICD-10-CM

## 2025-02-28 RX ORDER — LOSARTAN POTASSIUM 25 MG/1
25 TABLET ORAL DAILY
Qty: 90 TABLET | Refills: 2 | Status: SHIPPED | OUTPATIENT
Start: 2025-02-28 | End: 2026-02-28